# Patient Record
Sex: FEMALE | Race: OTHER | HISPANIC OR LATINO | URBAN - METROPOLITAN AREA
[De-identification: names, ages, dates, MRNs, and addresses within clinical notes are randomized per-mention and may not be internally consistent; named-entity substitution may affect disease eponyms.]

---

## 2024-08-19 ENCOUNTER — HOSPITAL ENCOUNTER (EMERGENCY)
Facility: HOSPITAL | Age: 24
Discharge: HOME | End: 2024-08-19
Attending: EMERGENCY MEDICINE
Payer: MEDICAID

## 2024-08-19 VITALS
OXYGEN SATURATION: 100 % | SYSTOLIC BLOOD PRESSURE: 115 MMHG | WEIGHT: 165 LBS | RESPIRATION RATE: 16 BRPM | HEIGHT: 62 IN | BODY MASS INDEX: 30.36 KG/M2 | HEART RATE: 79 BPM | DIASTOLIC BLOOD PRESSURE: 60 MMHG | TEMPERATURE: 98 F

## 2024-08-19 DIAGNOSIS — R10.9 ABDOMINAL PAIN DURING PREGNANCY: ICD-10-CM

## 2024-08-19 DIAGNOSIS — K29.70 GASTRITIS, PRESENCE OF BLEEDING UNSPECIFIED, UNSPECIFIED CHRONICITY, UNSPECIFIED GASTRITIS TYPE: Primary | ICD-10-CM

## 2024-08-19 DIAGNOSIS — O26.899 ABDOMINAL PAIN DURING PREGNANCY: ICD-10-CM

## 2024-08-19 LAB
ALBUMIN SERPL BCP-MCNC: 3.4 G/DL (ref 3.5–5.2)
ALP SERPL-CCNC: 101 U/L (ref 55–135)
ALT SERPL W/O P-5'-P-CCNC: 13 U/L (ref 10–44)
ANION GAP SERPL CALC-SCNC: 11 MMOL/L (ref 8–16)
AST SERPL-CCNC: 13 U/L (ref 10–40)
BASOPHILS # BLD AUTO: 0.02 K/UL (ref 0–0.2)
BASOPHILS NFR BLD: 0.2 % (ref 0–1.9)
BILIRUB SERPL-MCNC: 0.3 MG/DL (ref 0.1–1)
BILIRUB UR QL STRIP: NEGATIVE
BUN SERPL-MCNC: 6 MG/DL (ref 6–20)
CALCIUM SERPL-MCNC: 9.6 MG/DL (ref 8.7–10.5)
CHLORIDE SERPL-SCNC: 105 MMOL/L (ref 95–110)
CLARITY UR: CLEAR
CO2 SERPL-SCNC: 23 MMOL/L (ref 23–29)
COLOR UR: COLORLESS
CREAT SERPL-MCNC: 0.6 MG/DL (ref 0.5–1.4)
DIFFERENTIAL METHOD BLD: ABNORMAL
EOSINOPHIL # BLD AUTO: 0 K/UL (ref 0–0.5)
EOSINOPHIL NFR BLD: 0.3 % (ref 0–8)
ERYTHROCYTE [DISTWIDTH] IN BLOOD BY AUTOMATED COUNT: 13.2 % (ref 11.5–14.5)
EST. GFR  (NO RACE VARIABLE): >60 ML/MIN/1.73 M^2
GLUCOSE SERPL-MCNC: 94 MG/DL (ref 70–110)
GLUCOSE UR QL STRIP: NEGATIVE
HCT VFR BLD AUTO: 33.4 % (ref 37–48.5)
HGB BLD-MCNC: 11.7 G/DL (ref 12–16)
HGB UR QL STRIP: NEGATIVE
IMM GRANULOCYTES # BLD AUTO: 0.21 K/UL (ref 0–0.04)
IMM GRANULOCYTES NFR BLD AUTO: 2.2 % (ref 0–0.5)
KETONES UR QL STRIP: NEGATIVE
LEUKOCYTE ESTERASE UR QL STRIP: NEGATIVE
LIPASE SERPL-CCNC: 16 U/L (ref 4–60)
LYMPHOCYTES # BLD AUTO: 2.6 K/UL (ref 1–4.8)
LYMPHOCYTES NFR BLD: 27 % (ref 18–48)
MCH RBC QN AUTO: 31.5 PG (ref 27–31)
MCHC RBC AUTO-ENTMCNC: 35 G/DL (ref 32–36)
MCV RBC AUTO: 90 FL (ref 82–98)
MONOCYTES # BLD AUTO: 0.5 K/UL (ref 0.3–1)
MONOCYTES NFR BLD: 4.7 % (ref 4–15)
NEUTROPHILS # BLD AUTO: 6.3 K/UL (ref 1.8–7.7)
NEUTROPHILS NFR BLD: 65.6 % (ref 38–73)
NITRITE UR QL STRIP: NEGATIVE
NRBC BLD-RTO: 0 /100 WBC
PH UR STRIP: 7 [PH] (ref 5–8)
PLATELET # BLD AUTO: 240 K/UL (ref 150–450)
PMV BLD AUTO: 9.7 FL (ref 9.2–12.9)
POTASSIUM SERPL-SCNC: 3.5 MMOL/L (ref 3.5–5.1)
PROT SERPL-MCNC: 7.5 G/DL (ref 6–8.4)
PROT UR QL STRIP: NEGATIVE
RBC # BLD AUTO: 3.71 M/UL (ref 4–5.4)
SODIUM SERPL-SCNC: 139 MMOL/L (ref 136–145)
SP GR UR STRIP: 1 (ref 1–1.03)
URN SPEC COLLECT METH UR: ABNORMAL
UROBILINOGEN UR STRIP-ACNC: NEGATIVE EU/DL
WBC # BLD AUTO: 9.55 K/UL (ref 3.9–12.7)

## 2024-08-19 PROCEDURE — 99285 EMERGENCY DEPT VISIT HI MDM: CPT | Mod: 25

## 2024-08-19 PROCEDURE — 63600175 PHARM REV CODE 636 W HCPCS: Performed by: NURSE PRACTITIONER

## 2024-08-19 PROCEDURE — 83690 ASSAY OF LIPASE: CPT | Performed by: NURSE PRACTITIONER

## 2024-08-19 PROCEDURE — 96374 THER/PROPH/DIAG INJ IV PUSH: CPT

## 2024-08-19 PROCEDURE — 81003 URINALYSIS AUTO W/O SCOPE: CPT | Performed by: NURSE PRACTITIONER

## 2024-08-19 PROCEDURE — 85025 COMPLETE CBC W/AUTO DIFF WBC: CPT | Performed by: NURSE PRACTITIONER

## 2024-08-19 PROCEDURE — 25000003 PHARM REV CODE 250: Performed by: EMERGENCY MEDICINE

## 2024-08-19 PROCEDURE — 80053 COMPREHEN METABOLIC PANEL: CPT | Performed by: NURSE PRACTITIONER

## 2024-08-19 RX ORDER — LIDOCAINE HYDROCHLORIDE 20 MG/ML
15 SOLUTION OROPHARYNGEAL ONCE
Status: COMPLETED | OUTPATIENT
Start: 2024-08-19 | End: 2024-08-19

## 2024-08-19 RX ORDER — SUCRALFATE 1 G/10ML
1 SUSPENSION ORAL 4 TIMES DAILY
Qty: 414 ML | Refills: 0 | Status: SHIPPED | OUTPATIENT
Start: 2024-08-19

## 2024-08-19 RX ORDER — ALUMINUM HYDROXIDE, MAGNESIUM HYDROXIDE, AND SIMETHICONE 1200; 120; 1200 MG/30ML; MG/30ML; MG/30ML
30 SUSPENSION ORAL ONCE
Status: COMPLETED | OUTPATIENT
Start: 2024-08-19 | End: 2024-08-19

## 2024-08-19 RX ORDER — ONDANSETRON 4 MG/1
4 TABLET, FILM COATED ORAL EVERY 6 HOURS
Qty: 12 TABLET | Refills: 0 | Status: SHIPPED | OUTPATIENT
Start: 2024-08-19

## 2024-08-19 RX ORDER — ONDANSETRON HYDROCHLORIDE 2 MG/ML
4 INJECTION, SOLUTION INTRAVENOUS
Status: COMPLETED | OUTPATIENT
Start: 2024-08-19 | End: 2024-08-19

## 2024-08-19 RX ADMIN — ALUMINUM HYDROXIDE, MAGNESIUM HYDROXIDE, AND SIMETHICONE 30 ML: 1200; 120; 1200 SUSPENSION ORAL at 10:08

## 2024-08-19 RX ADMIN — LIDOCAINE HYDROCHLORIDE 15 ML: 20 SOLUTION ORAL at 10:08

## 2024-08-19 RX ADMIN — ONDANSETRON 4 MG: 2 INJECTION INTRAMUSCULAR; INTRAVENOUS at 09:08

## 2024-08-20 NOTE — DISCHARGE INSTRUCTIONS
Your labs show no infection. The fetus looks good. You likely have gastritis. Follow the instructions given to you about changing your diet. Medicine was sent to your pharmacy. Follow up with your doctor.

## 2024-08-20 NOTE — ED PROVIDER NOTES
Emergency Department Encounter  Provider Note  Encounter Date: 2024    Patient Name: Cindy Greenberg  MRN: [id number]    History of Present Illness   HPI  History of Present Illness:    Chief Complaint:   Chief Complaint   Patient presents with    Abdominal Pain     Since this morning that has been intermittent. States it is mid/epigastric abdominal pain. States she is  BET[place] 23. Also endorses Nausea and diarrhea.. Denies vaginal discharge.      23yo  at 19w6d pw epigastric pain, nausea, diarrhea. Denies vaginal bleeding or discharge. Tried nothing at home for symptoms. No fevers.     The following PMH/PSH/SocHx/FamHx has been reviewed by myself:  History reviewed. No pertinent past medical history.  History reviewed. No pertinent surgical history.     No family history on file.  Allergies reviewed:  Review of patient's allergies indicates:  No Known Allergies  Medications reviewed:  Discharge Medication List as of 2024 11:14 PM        START taking these medications    Details   sucralfate (CARAFATE) 100 mg/mL suspension Take 10 mLs (1 g total) by mouth 4 (four) times daily., Starting Mon 2024, Normal             Physical Exam     Initial Vitals [24 194]   BP Pulse Resp Temp SpO2   115/60 79 16 97.5 °F (36.4 °C) 100 %      MAP       --           Triage vital signs reviewed.    Constitutional: Well-nourished, well-developed. Not in acute distress.  HENT: Normocephalic, atraumatic. Moist mucous membranes.  Eyes: No conjunctival injection.  Resp: Normal respiratory effort, breathing unlabored.  Cardio: Regular rate and rhythm.  GI: Abdomen gravid. Slight ttp in epigastrium, no RUQ, RLQ ttp.   MSK: Extremities without any deformities noted. No lower extremity edema.  Skin: Warm and dry. No rashes or lesions noted.  Neuro: Awake and alert. Moves all extremities.    ED Course   Procedures    Medical Decision Making    History Acquisition     The history is provided by the  patient.     Review of prior external/non ED notes: 6/14/24 ob us with IUP 10 w 3d     Differential Diagnoses   Based on available information and initial assessment, the working Differential Diagnosis includes, but is not limited to:  AAA, aortic dissection, mesenteric ischemia, perforated viscous, MI/ACS, SBO/volvulus, incarcerated/strangulated hernia, intussusception, ileus, appendicitis, cholecystitis, cholangitis, diverticulitis, esophagitis, hepatitis, nephrolithiasis, pancreatitis, gastroenteritis, colitis, IBD/IBS, biliary colic, GERD, PUD, constipation, UTI/pyelonephritis,  disorder.    EKG   EKG ordered and independently reviewed by me:     Labs   Lab tests ordered and independently reviewed by me:    Labs Reviewed   CBC W/ AUTO DIFFERENTIAL - Abnormal       Result Value    WBC 9.55      RBC 3.71 (*)     Hemoglobin 11.7 (*)     Hematocrit 33.4 (*)     MCV 90      MCH 31.5 (*)     MCHC 35.0      RDW 13.2      Platelets 240      MPV 9.7      Immature Granulocytes 2.2 (*)     Gran # (ANC) 6.3      Immature Grans (Abs) 0.21 (*)     Lymph # 2.6      Mono # 0.5      Eos # 0.0      Baso # 0.02      nRBC 0      Gran % 65.6      Lymph % 27.0      Mono % 4.7      Eosinophil % 0.3      Basophil % 0.2      Differential Method Automated     COMPREHENSIVE METABOLIC PANEL - Abnormal    Sodium 139      Potassium 3.5      Chloride 105      CO2 23      Glucose 94      BUN 6      Creatinine 0.6      Calcium 9.6      Total Protein 7.5      Albumin 3.4 (*)     Total Bilirubin 0.3      Alkaline Phosphatase 101      AST 13      ALT 13      eGFR >60      Anion Gap 11     URINALYSIS, REFLEX TO URINE CULTURE - Abnormal    Specimen UA Urine, Clean Catch      Color, UA Colorless (*)     Appearance, UA Clear      pH, UA 7.0      Specific Gravity, UA 1.005      Protein, UA Negative      Glucose, UA Negative      Ketones, UA Negative      Bilirubin (UA) Negative      Occult Blood UA Negative      Nitrite, UA Negative       Urobilinogen, UA Negative      Leukocytes, UA Negative      Narrative:     Specimen Source->Urine   LIPASE    Lipase 16         Imaging   Imaging ordered and independently reviewed by me:   Imaging Results              US OB 14+ Wks, TransAbd, Single Gestation (Final result)  Result time 08/19/24 21:45:01      Final result by Alejo Wolfe MD (08/19/24 21:45:01)                   Impression:      Single live intrauterine pregnancy noted in cephalic presentation..      Electronically signed by: Alejo Wolfe  Date:    08/19/2024  Time:    21:45               Narrative:    EXAMINATION:  US OB 14+ WEEKS, TRANSABDOM, SINGLE GESTATION    CLINICAL HISTORY:  Other specified pregnancy related conditions, unspecified trimester    TECHNIQUE:  Transabdominal 2nd or 3rd trimester obstetrical ultrasound was performed.    COMPARISON:  None.    FINDINGS:  Transabdominal limited obstetrical ultrasound was performed, an intrauterine pregnancy is identified in cephalic presentation with a posterior placenta.  There is no evidence for placental hemorrhage.  The fetal heart rate is 149 beats per minute.  The maximal vertical pocket of amniotic fluid is 3.49 cm.  The cervix is not well visualized.  Fetal measurements to calculate gestational age not obtained.                                         Additional Consideration   Cindy Greenberg  presents to the Emergency Department today with epigastric pain, n/v/d. Pregnant. Labs and us done from triage; unremarkable exam. Possible gastritis. No evidence cholecystitis, pancreatitis, appendicitis. Dc with carafate, zofran.     Additional testing considered but not indicated during the course of this workup: further imaging and labwork, not indicated  Co-morbidities/chronic illness/exacerbation of chronic illness considered which impacted clinical decision making: none  Procedures done in the ED or plan for the OR: No  Social determinants of care considered during  development of treatment plan include: Decreased medical literacy  Discussion of management or test interpretation with external provider: No  DNR discussion: No    The patient's list of active medications and allergies as documented per RN staff has been reviewed.  Medications given in the ED and/or prescribed:   Medications   ondansetron injection 4 mg (4 mg Intravenous Given 8/19/24 2114)   aluminum-magnesium hydroxide-simethicone 200-200-20 mg/5 mL suspension 30 mL (30 mLs Oral Given 8/19/24 2244)     And   LIDOcaine viscous HCl 2% oral solution 15 mL (15 mLs Oral Given 8/19/24 2244)             ED Course as of 08/19/24 2341   Mon Aug 19, 2024   2033 Urinalysis, Reflex to Urine Culture Urine, Clean Catch(!) [DT]   2341 CBC auto differential(!)  Independently interpreted by me; unremarkable or consistent with the patient's baseline   [CS]   2341 Comprehensive metabolic panel(!)  Independently interpreted by me; unremarkable or consistent with the patient's baseline   [CS]   2341 Lipase: 16 [CS]   2341 US OB 14+ Wks, TransAbd, Single Gestation  IUP, no abnormalities noted [CS]      ED Course User Index  [CS] Daja Burrell MD  [DT] Joya Velez, ALEJANDRA       Explanation of disposition: Discharge    Clinical Impression:     1. Gastritis, presence of bleeding unspecified, unspecified chronicity, unspecified gastritis type    2. Abdominal pain during pregnancy         All results from the workup were reviewed with the patient/family in detail. I discussed with the patient and/or the family/caregiver that today's evaluation in the ED does not suggest any emergent or life-threatening medical conditions that would require hospitalization or immediate intervention beyond what was provided in the ED. I believe the patient is safe for discharge.  However, a reassuring evaluation in the ED does not preclude the presence or development of a serious or life-threatening condition. As such, strict return precautions were  discussed with the patient expressing understanding of instructions, and all questions answered. The patient/family communicates understanding of all this information and all remaining questions and concerns were addressed at this time.    The patient/family has been provided with verbal and printed direction regarding our final diagnosis(es) as well as instructions regarding use of OTC and/or Rx medications intended to manage the patient's aforementioned conditions including:  ED Prescriptions       Medication Sig Dispense Start Date End Date Auth. Provider    sucralfate (CARAFATE) 100 mg/mL suspension Take 10 mLs (1 g total) by mouth 4 (four) times daily. 414 mL 8/19/2024 -- Daja Burrell MD    ondansetron (ZOFRAN) 4 MG tablet Take 1 tablet (4 mg total) by mouth every 6 (six) hours. 12 tablet 8/19/2024 -- Daja Burrell MD          The patient's condition does not warrant review of the  and prescription of controlled substances.      ED Disposition Condition    Discharge Stable               Daja Burrell MD  08/19/24 4346

## 2024-10-08 ENCOUNTER — HOSPITAL ENCOUNTER (OUTPATIENT)
Facility: HOSPITAL | Age: 24
Discharge: HOME | End: 2024-10-08
Attending: OBSTETRICS & GYNECOLOGY | Admitting: OBSTETRICS & GYNECOLOGY
Payer: MEDICAID

## 2024-10-08 VITALS
SYSTOLIC BLOOD PRESSURE: 95 MMHG | DIASTOLIC BLOOD PRESSURE: 54 MMHG | WEIGHT: 164.88 LBS | RESPIRATION RATE: 19 BRPM | HEART RATE: 68 BPM | HEIGHT: 62 IN | OXYGEN SATURATION: 96 % | BODY MASS INDEX: 30.34 KG/M2 | TEMPERATURE: 98 F

## 2024-10-08 DIAGNOSIS — O26.899 ABDOMINAL PAIN AFFECTING PREGNANCY: ICD-10-CM

## 2024-10-08 DIAGNOSIS — R10.9 ABDOMINAL PAIN AFFECTING PREGNANCY: ICD-10-CM

## 2024-10-08 LAB
ALBUMIN SERPL BCP-MCNC: 2.9 G/DL (ref 3.5–5.2)
ALP SERPL-CCNC: 111 U/L (ref 55–135)
ALT SERPL W/O P-5'-P-CCNC: 9 U/L (ref 10–44)
ANION GAP SERPL CALC-SCNC: 11 MMOL/L (ref 8–16)
AST SERPL-CCNC: 11 U/L (ref 10–40)
BASOPHILS # BLD AUTO: 0.02 K/UL (ref 0–0.2)
BASOPHILS NFR BLD: 0.2 % (ref 0–1.9)
BILIRUB SERPL-MCNC: 0.3 MG/DL (ref 0.1–1)
BILIRUB UR QL STRIP: NEGATIVE
BUN SERPL-MCNC: 7 MG/DL (ref 6–20)
CALCIUM SERPL-MCNC: 8.8 MG/DL (ref 8.7–10.5)
CHLORIDE SERPL-SCNC: 106 MMOL/L (ref 95–110)
CLARITY UR: ABNORMAL
CO2 SERPL-SCNC: 19 MMOL/L (ref 23–29)
COLOR UR: YELLOW
CREAT SERPL-MCNC: 0.6 MG/DL (ref 0.5–1.4)
DIFFERENTIAL METHOD BLD: ABNORMAL
EOSINOPHIL # BLD AUTO: 0 K/UL (ref 0–0.5)
EOSINOPHIL NFR BLD: 0.1 % (ref 0–8)
ERYTHROCYTE [DISTWIDTH] IN BLOOD BY AUTOMATED COUNT: 12.7 % (ref 11.5–14.5)
EST. GFR  (NO RACE VARIABLE): >60 ML/MIN/1.73 M^2
GLUCOSE SERPL-MCNC: 173 MG/DL (ref 70–110)
GLUCOSE UR QL STRIP: NEGATIVE
HCT VFR BLD AUTO: 31.2 % (ref 37–48.5)
HGB BLD-MCNC: 10.8 G/DL (ref 12–16)
HGB UR QL STRIP: NEGATIVE
IMM GRANULOCYTES # BLD AUTO: 0.13 K/UL (ref 0–0.04)
IMM GRANULOCYTES NFR BLD AUTO: 1.1 % (ref 0–0.5)
KETONES UR QL STRIP: NEGATIVE
LEUKOCYTE ESTERASE UR QL STRIP: NEGATIVE
LYMPHOCYTES # BLD AUTO: 1.7 K/UL (ref 1–4.8)
LYMPHOCYTES NFR BLD: 14.5 % (ref 18–48)
MCH RBC QN AUTO: 31.8 PG (ref 27–31)
MCHC RBC AUTO-ENTMCNC: 34.6 G/DL (ref 32–36)
MCV RBC AUTO: 92 FL (ref 82–98)
MONOCYTES # BLD AUTO: 0.3 K/UL (ref 0.3–1)
MONOCYTES NFR BLD: 2.3 % (ref 4–15)
NEUTROPHILS # BLD AUTO: 9.4 K/UL (ref 1.8–7.7)
NEUTROPHILS NFR BLD: 81.8 % (ref 38–73)
NITRITE UR QL STRIP: NEGATIVE
NRBC BLD-RTO: 0 /100 WBC
PH UR STRIP: 7 [PH] (ref 5–8)
PLATELET # BLD AUTO: 201 K/UL (ref 150–450)
PMV BLD AUTO: 9.8 FL (ref 9.2–12.9)
POTASSIUM SERPL-SCNC: 3.2 MMOL/L (ref 3.5–5.1)
PROT SERPL-MCNC: 6.5 G/DL (ref 6–8.4)
PROT UR QL STRIP: ABNORMAL
RBC # BLD AUTO: 3.4 M/UL (ref 4–5.4)
SODIUM SERPL-SCNC: 136 MMOL/L (ref 136–145)
SP GR UR STRIP: 1.02 (ref 1–1.03)
URN SPEC COLLECT METH UR: ABNORMAL
UROBILINOGEN UR STRIP-ACNC: NEGATIVE EU/DL
WBC # BLD AUTO: 11.52 K/UL (ref 3.9–12.7)

## 2024-10-08 PROCEDURE — 85025 COMPLETE CBC W/AUTO DIFF WBC: CPT | Performed by: OBSTETRICS & GYNECOLOGY

## 2024-10-08 PROCEDURE — 36415 COLL VENOUS BLD VENIPUNCTURE: CPT | Performed by: OBSTETRICS & GYNECOLOGY

## 2024-10-08 PROCEDURE — 96360 HYDRATION IV INFUSION INIT: CPT

## 2024-10-08 PROCEDURE — 59025 FETAL NON-STRESS TEST: CPT

## 2024-10-08 PROCEDURE — 59025 FETAL NON-STRESS TEST: CPT | Mod: 26,,, | Performed by: OBSTETRICS & GYNECOLOGY

## 2024-10-08 PROCEDURE — 96361 HYDRATE IV INFUSION ADD-ON: CPT

## 2024-10-08 PROCEDURE — 63600175 PHARM REV CODE 636 W HCPCS: Performed by: OBSTETRICS & GYNECOLOGY

## 2024-10-08 PROCEDURE — 99211 OFF/OP EST MAY X REQ PHY/QHP: CPT

## 2024-10-08 PROCEDURE — 96374 THER/PROPH/DIAG INJ IV PUSH: CPT

## 2024-10-08 PROCEDURE — G0378 HOSPITAL OBSERVATION PER HR: HCPCS

## 2024-10-08 PROCEDURE — 99214 OFFICE O/P EST MOD 30 MIN: CPT | Mod: 25,TH,, | Performed by: OBSTETRICS & GYNECOLOGY

## 2024-10-08 PROCEDURE — 81003 URINALYSIS AUTO W/O SCOPE: CPT | Performed by: OBSTETRICS & GYNECOLOGY

## 2024-10-08 PROCEDURE — 25000003 PHARM REV CODE 250: Performed by: OBSTETRICS & GYNECOLOGY

## 2024-10-08 PROCEDURE — 80053 COMPREHEN METABOLIC PANEL: CPT | Performed by: OBSTETRICS & GYNECOLOGY

## 2024-10-08 RX ORDER — ACETAMINOPHEN 325 MG/1
650 TABLET ORAL ONCE
Status: COMPLETED | OUTPATIENT
Start: 2024-10-08 | End: 2024-10-08

## 2024-10-08 RX ORDER — ONDANSETRON HYDROCHLORIDE 2 MG/ML
4 INJECTION, SOLUTION INTRAVENOUS ONCE
Status: COMPLETED | OUTPATIENT
Start: 2024-10-08 | End: 2024-10-08

## 2024-10-08 RX ORDER — DEXTROSE, SODIUM CHLORIDE, SODIUM LACTATE, POTASSIUM CHLORIDE, AND CALCIUM CHLORIDE 5; .6; .31; .03; .02 G/100ML; G/100ML; G/100ML; G/100ML; G/100ML
INJECTION, SOLUTION INTRAVENOUS CONTINUOUS
Status: DISCONTINUED | OUTPATIENT
Start: 2024-10-08 | End: 2024-10-08 | Stop reason: HOSPADM

## 2024-10-08 RX ADMIN — SODIUM CHLORIDE, SODIUM LACTATE, POTASSIUM CHLORIDE, CALCIUM CHLORIDE AND DEXTROSE MONOHYDRATE: 5; 600; 310; 30; 20 INJECTION, SOLUTION INTRAVENOUS at 07:10

## 2024-10-08 RX ADMIN — ONDANSETRON 4 MG: 2 INJECTION INTRAMUSCULAR; INTRAVENOUS at 07:10

## 2024-10-08 RX ADMIN — ACETAMINOPHEN 650 MG: 325 TABLET ORAL at 08:10

## 2024-10-08 NOTE — DISCHARGE INSTRUCTIONS
Prenatal Concerns and Teaching    Severe headache not relieved with a dose of tylenol   Blurry vision or seeing black spots  Sudden swelling in your face or hands  Sudden weight gain in only a few days   Severe stomach pains or cramps  Vomiting lasting more than 24 hours   Fever greater than 100.4 degrees  Vaginal bleeding that is more than just spotting   Excessive and unusual vaginal discharge   A gush or flow of water fluid from your vagina   Significant decrease or absence of baby's movement (starting at 24-36 weeks)   (less than 37 weeks): more than 4 contractions in an hour for 2 hours   Term (greater than 37 weeks): contractions every 3-5 minutes for 2 hours  Increase fluid intake to 8-10 glasses of water DAILY    FOLLOW UP WITH MD AS SCHEDULED     LABOR     What is  labor?   labor is labor that occurs before 37 completed weeks of pregnancy.  Your baby could be born to early and have serious health problems.    Will you have  labor?   labor can happen to any woman, but some women are at greater risk than others.  A woman is more likely to have  labor if she:  Is pregnant with twins or more  Had a premature birth (before 37 completed weeks of pregnancy), in another pregnancy  Has certain problems of the uterus or cervix    Call your health care provider or go to the hospital right away if you have any of these warning signs:  Contractions that make your belly tighten up like a fist every 10 minutes or more often  Change in the color of your vaginal discharge, or bleeding from your vagina  The feeling that your baby is pushing down.  This is called pelvic pressure  Low, dull backache  Cramps that feel like your period  Belly cramps with or without diarrhea      Home Care:   It can help to drink plenty of water and take warm baths. Do what you can ahead of time to prepare for giving birth so youll have less to worry about later.   Keep a record of your  contractions. Write down what time each one starts and how long it lasts. A stopwatch is helpful. Look for the pattern of regularly spaced out contractions with a gradual increase in the time each one lasts.   Dont be embarrassed about going to the hospital with a false alarm.     More about contractions    When any muscle in your body contracts, it becomes tight or hard to the touch.  Your uterus is a muscle.  When it contracts, you will feel it tighten like a fist.  A contraction doesn't have to be painful.  It may feel like cramping or lower back pain.  When the contraction stops, your uterus becomes soft again.  It's normal for your uterus to contract at times during pregnancy.  This may happen when you first lie down, after sex, or after you walk up and down stairs.  It is not normal to have regular, frequent contractions before your baby is due.  If you feel a contraction every 10 minutes or more often during 1 hour (more than five contractions in an hour), then your uterus is josé too much.  Call your health care provider if this happens.         GOING THE FULL 40 WEEKS      More and more births are being scheduled a little early for non-medical reasons.  Experts are learning that this can cause problems for both mom and baby.  If your pregnancy is healthy, wait for labor to begin on its own.    If your pregnancy is healthy and you are planning to schedule your baby's birth, its best to stay pregnant for at least 39 weeks.  Babies born too early may have more health problems at birth and later in life than babies born later.  Being pregnant 39 weeks gives your baby's body all the time it needs to grow.    Here's why your baby needs 39 weeks:    Important organs, like his brain, lungs and liver, get all the time they need to develop  He/she is less likely to have vision and hearing problems after birth  He/she has time to gain more weight in the womb.  Babies born a healthy weight have an easier time  staying warm than babies born too small  He/she can suck and swallow and stay awake long to eat after he/she's born.  Babies born early sometimes can't do these things.    KICK COUNTS    Its normal to worry about your babys health. One way you can know your babys doing well is to record the babys movements once a day. This is called a kick count. You will usually feel your baby move by the 20th week of pregnancy. Remember to take your kick count records to all your appointments with your healthcare provider.     How to Count Kicks   Choose a time when the baby is active, such as after a meal.   Sit comfortably or lie on your side.   The first time the baby moves, write down the time.   Count each movement until the baby has moved 10 times. This can take from 20 minutes to 2 hours.   If the baby hasnt moved 4 times in 1 hour, pat your stomach to wake the baby up.   Write down the time you feel the babys 10th movement.   Try to do it at the same time each day.    When to Call Your Healthcare Provider     Call your healthcare provider right away if you notice any of the following:   Your baby moves fewer than 10 times in 4 hours while youre doing kick counts.   Your baby moves much less often than on the days before.   You have not felt your baby move all day.      TIME NUMBER OF KICKS                                                  Over the Counter Medications Safe to Take During Pregnancy       Pain Relief   Tylenol or acetaminophen (plain/extra strength)   Caution: Do not take aspirin (Anacin, Jennifer) or ibuprofen (Advil, Motrin).     Medicine for Digestive Upsets   Antacid (Tums, Rolaids, Mylanta, Maalox, Pepcid, Prevacid)   Simethicone (Gas-X, Mylicon for gas pain, Gaviscon)   Imodium or BRAT diet (bananas, rice, applesauce, toast or tea) for diarrhea   Medicine for Coughs/Colds   Guaifenesin (Robitussin)   Guaifenesin plus dextromethorphan (Robitussin-DM)   Cough Drops   Vicks VapoRub   Acetaminophen    Allergy Relief   Tylenol or acetaminophen (plain/extra strength) is ok   Chlorpheniramine antihistamine alone (chlor-Trimetron)   Benadryl tablets   Saline nasal spray   Neti-pot or sinus rinse   Claritin, Zyrtec, Allegra   Options for Constipation   Fiber can be used regularly (Metamucil, MiraLax, Citrucel, BeneFiber)   Laxatives can be used occasionally (Colace, Dulcolax)   Tucks for hemorrhoids   Mix of Prune Juice, OJ, 7UP- equal parts

## 2024-10-08 NOTE — PROGRESS NOTES
Discharge instructions reviewed with pt. Verbalizes understanding. No further questions or concerns at this time. Discharged home for self care.

## 2024-10-08 NOTE — H&P
"HISTORY AND PHYSICAL  ANTEPARTUM          Subjective:       Cindy Greenberg is a 24 y.o.  female with IUP at 27w0d via patient prsenting today with nausea and vomitting this morning. States she ate a salad and subsequently experienced NBNB vomitting. Currently follows with Dr. Tafoya at [facility]. Prior to this, states she did have one ED visit for N/V, but was diagnosed with GERD and discharged home. Denies any vaginal bleeding or vaginal discharge. Fundal height on examination this visit notable for 30 weeks GA, however, GA is only 27 weeks. Denies contractions or abdominal pain. FHR appropriate at 140s.     PMHx: No past medical history on file.    PSHx: No past surgical history on file.    All: Review of patient's allergies indicates:  No Known Allergies    Meds:   Medications Prior to Admission   Medication Sig Dispense Refill Last Dose/Taking    ondansetron (ZOFRAN) 4 MG tablet Take 1 tablet (4 mg total) by mouth every 6 (six) hours. 12 tablet 0     sucralfate (CARAFATE) 100 mg/mL suspension Take 10 mLs (1 g total) by mouth 4 (four) times daily. 414 mL 0        SH:   Social History     Socioeconomic History    Marital status: Unknown       FH: No family history on file.    OBHx:   OB History    Para Term  AB Living   1 0 0 0 0 0   SAB IAB Ectopic Multiple Live Births   0 0 0 0 0      # Outcome Date GA Lbr Ronald/2nd Weight Sex Type Anes PTL Lv   1 Current                Objective:       BP (!) 95/54   Pulse 68   Temp 98.2 °F (36.8 °C) (Oral)   Resp 19   Ht 5' 2" (1.575 m)   Wt 74.8 kg (164 lb 14.5 oz)   SpO2 96%   BMI 30.16 kg/m²     Vitals:    10/08/24 0857 10/08/24 0858 10/08/24 0902 10/08/24 0903   BP:    (!) 95/54   Pulse: 65 65 67 68   Resp:       Temp:       TempSrc:       SpO2: 96%  96%    Weight:       Height:           General:   alert, appears stated age, and cooperative   Lungs:   clear to auscultation bilaterally   Heart:   regular rate and rhythm, S1, S2 normal, " no murmur, click, rub or gallop   Abdomen:  soft, non-tender; bowel sounds normal; no masses,  no organomegaly   Extremities negative edema, negative erythema   FHT: 140s Cat 1 (reassuring)                 TOCO: Not assessed   Presentations: Not assessed   Cervix:     Dilation: Closed    Effacement: 30%    Station:  0               Assessment:      27w0d weeks gestation presents for nausea and vomiting after eating bad salad. Received IV zofran and PO tylenol with improvement to symptoms. FHR stable and patient denied any additional episodes after.      Plan:     Stable for discharge.   Please follow up with outpatient OBGYN.  ED precautions discussed.     Shane Anaya MD  [facility] PGY-2     Case discussed with Dr. Arroyo, attestation to follow.

## 2024-10-08 NOTE — NURSING
Pt presents to labor and delivery with complaints of upper abdominal pain with nausea/vomiting that started around 3am. Pt states that she ate a salad at 6pm. She denies any contractions, vaginal bleeding, or leaking of vaginal fluids. External fetal monitor applied. FHR 130s. Moderate variability. +accels. -decels. No contractions noted at this time. Dr. Arroyo notified of patient's arrival. Bed in lowest position, call light within reach. Patient safety maintained.

## 2025-02-02 ENCOUNTER — HOSPITAL ENCOUNTER (OUTPATIENT)
Facility: HOSPITAL | Age: 25
Discharge: HOME | End: 2025-02-03
Attending: EMERGENCY MEDICINE | Admitting: STUDENT IN AN ORGANIZED HEALTH CARE EDUCATION/TRAINING PROGRAM

## 2025-02-02 DIAGNOSIS — K35.80 ACUTE APPENDICITIS: Primary | ICD-10-CM

## 2025-02-02 LAB
ALBUMIN SERPL BCP-MCNC: 4.2 G/DL (ref 3.5–5.2)
ALP SERPL-CCNC: 108 U/L (ref 40–150)
ALT SERPL W/O P-5'-P-CCNC: 44 U/L (ref 10–44)
ANION GAP SERPL CALC-SCNC: 15 MMOL/L (ref 8–16)
AST SERPL-CCNC: 32 U/L (ref 10–40)
B-HCG UR QL: NEGATIVE
BASOPHILS # BLD AUTO: 0.01 K/UL (ref 0–0.2)
BASOPHILS NFR BLD: 0.1 % (ref 0–1.9)
BILIRUB SERPL-MCNC: 0.4 MG/DL (ref 0.1–1)
BILIRUB UR QL STRIP: NEGATIVE
BUN SERPL-MCNC: 19 MG/DL (ref 6–20)
CALCIUM SERPL-MCNC: 9 MG/DL (ref 8.7–10.5)
CHLORIDE SERPL-SCNC: 108 MMOL/L (ref 95–110)
CLARITY UR: CLEAR
CO2 SERPL-SCNC: 17 MMOL/L (ref 23–29)
COLOR UR: YELLOW
CREAT SERPL-MCNC: 0.7 MG/DL (ref 0.5–1.4)
CTP QC/QA: YES
DIFFERENTIAL METHOD BLD: ABNORMAL
EOSINOPHIL # BLD AUTO: 0 K/UL (ref 0–0.5)
EOSINOPHIL NFR BLD: 0.1 % (ref 0–8)
ERYTHROCYTE [DISTWIDTH] IN BLOOD BY AUTOMATED COUNT: 12.6 % (ref 11.5–14.5)
EST. GFR  (NO RACE VARIABLE): >60 ML/MIN/1.73 M^2
GLUCOSE SERPL-MCNC: 108 MG/DL (ref 70–110)
GLUCOSE UR QL STRIP: NEGATIVE
HCT VFR BLD AUTO: 36.6 % (ref 37–48.5)
HGB BLD-MCNC: 12.9 G/DL (ref 12–16)
HGB UR QL STRIP: ABNORMAL
IMM GRANULOCYTES # BLD AUTO: 0.06 K/UL (ref 0–0.04)
IMM GRANULOCYTES NFR BLD AUTO: 0.5 % (ref 0–0.5)
INFLUENZA A, MOLECULAR: NEGATIVE
INFLUENZA B, MOLECULAR: NEGATIVE
KETONES UR QL STRIP: NEGATIVE
LEUKOCYTE ESTERASE UR QL STRIP: NEGATIVE
LIPASE SERPL-CCNC: 21 U/L (ref 4–60)
LYMPHOCYTES # BLD AUTO: 1.4 K/UL (ref 1–4.8)
LYMPHOCYTES NFR BLD: 10.5 % (ref 18–48)
MCH RBC QN AUTO: 30.3 PG (ref 27–31)
MCHC RBC AUTO-ENTMCNC: 35.2 G/DL (ref 32–36)
MCV RBC AUTO: 86 FL (ref 82–98)
MONOCYTES # BLD AUTO: 0.3 K/UL (ref 0.3–1)
MONOCYTES NFR BLD: 2.4 % (ref 4–15)
NEUTROPHILS # BLD AUTO: 11.3 K/UL (ref 1.8–7.7)
NEUTROPHILS NFR BLD: 86.4 % (ref 38–73)
NITRITE UR QL STRIP: NEGATIVE
NRBC BLD-RTO: 0 /100 WBC
PH UR STRIP: 7 [PH] (ref 5–8)
PLATELET # BLD AUTO: 274 K/UL (ref 150–450)
PMV BLD AUTO: 10.3 FL (ref 9.2–12.9)
POTASSIUM SERPL-SCNC: 3.5 MMOL/L (ref 3.5–5.1)
PROT SERPL-MCNC: 8 G/DL (ref 6–8.4)
PROT UR QL STRIP: ABNORMAL
RBC # BLD AUTO: 4.26 M/UL (ref 4–5.4)
SARS-COV-2 RDRP RESP QL NAA+PROBE: NEGATIVE
SODIUM SERPL-SCNC: 140 MMOL/L (ref 136–145)
SP GR UR STRIP: 1.02 (ref 1–1.03)
SPECIMEN SOURCE: NORMAL
URN SPEC COLLECT METH UR: ABNORMAL
UROBILINOGEN UR STRIP-ACNC: NEGATIVE EU/DL
WBC # BLD AUTO: 13.1 K/UL (ref 3.9–12.7)

## 2025-02-02 PROCEDURE — 63600175 PHARM REV CODE 636 W HCPCS: Performed by: EMERGENCY MEDICINE

## 2025-02-02 PROCEDURE — 25500020 PHARM REV CODE 255: Performed by: EMERGENCY MEDICINE

## 2025-02-02 PROCEDURE — G0378 HOSPITAL OBSERVATION PER HR: HCPCS

## 2025-02-02 PROCEDURE — 81003 URINALYSIS AUTO W/O SCOPE: CPT | Performed by: NURSE PRACTITIONER

## 2025-02-02 PROCEDURE — 96366 THER/PROPH/DIAG IV INF ADDON: CPT

## 2025-02-02 PROCEDURE — 87635 SARS-COV-2 COVID-19 AMP PRB: CPT

## 2025-02-02 PROCEDURE — 25000003 PHARM REV CODE 250

## 2025-02-02 PROCEDURE — 85025 COMPLETE CBC W/AUTO DIFF WBC: CPT | Performed by: NURSE PRACTITIONER

## 2025-02-02 PROCEDURE — 99285 EMERGENCY DEPT VISIT HI MDM: CPT | Mod: 25

## 2025-02-02 PROCEDURE — 83690 ASSAY OF LIPASE: CPT | Performed by: NURSE PRACTITIONER

## 2025-02-02 PROCEDURE — 25000003 PHARM REV CODE 250: Performed by: EMERGENCY MEDICINE

## 2025-02-02 PROCEDURE — 80053 COMPREHEN METABOLIC PANEL: CPT | Performed by: NURSE PRACTITIONER

## 2025-02-02 PROCEDURE — 87502 INFLUENZA DNA AMP PROBE: CPT

## 2025-02-02 PROCEDURE — 63600175 PHARM REV CODE 636 W HCPCS: Mod: TB,JZ

## 2025-02-02 PROCEDURE — 99222 1ST HOSP IP/OBS MODERATE 55: CPT | Mod: ,,, | Performed by: STUDENT IN AN ORGANIZED HEALTH CARE EDUCATION/TRAINING PROGRAM

## 2025-02-02 PROCEDURE — 96375 TX/PRO/DX INJ NEW DRUG ADDON: CPT

## 2025-02-02 PROCEDURE — 96365 THER/PROPH/DIAG IV INF INIT: CPT

## 2025-02-02 PROCEDURE — 96361 HYDRATE IV INFUSION ADD-ON: CPT

## 2025-02-02 PROCEDURE — 81025 URINE PREGNANCY TEST: CPT | Performed by: NURSE PRACTITIONER

## 2025-02-02 RX ORDER — LIDOCAINE HYDROCHLORIDE 10 MG/ML
1 INJECTION, SOLUTION EPIDURAL; INFILTRATION; INTRACAUDAL; PERINEURAL ONCE AS NEEDED
Status: DISCONTINUED | OUTPATIENT
Start: 2025-02-02 | End: 2025-02-03 | Stop reason: HOSPADM

## 2025-02-02 RX ORDER — TALC
6 POWDER (GRAM) TOPICAL NIGHTLY PRN
Status: DISCONTINUED | OUTPATIENT
Start: 2025-02-02 | End: 2025-02-03 | Stop reason: HOSPADM

## 2025-02-02 RX ORDER — IBUPROFEN 600 MG/1
600 TABLET ORAL EVERY 6 HOURS
COMMUNITY
Start: 2025-01-12

## 2025-02-02 RX ORDER — SODIUM CHLORIDE 0.9 % (FLUSH) 0.9 %
10 SYRINGE (ML) INJECTION
Status: DISCONTINUED | OUTPATIENT
Start: 2025-02-02 | End: 2025-02-03 | Stop reason: HOSPADM

## 2025-02-02 RX ORDER — SODIUM CHLORIDE, SODIUM LACTATE, POTASSIUM CHLORIDE, CALCIUM CHLORIDE 600; 310; 30; 20 MG/100ML; MG/100ML; MG/100ML; MG/100ML
INJECTION, SOLUTION INTRAVENOUS CONTINUOUS
Status: DISCONTINUED | OUTPATIENT
Start: 2025-02-02 | End: 2025-02-03

## 2025-02-02 RX ORDER — IBUPROFEN 600 MG/1
600 TABLET ORAL EVERY 6 HOURS PRN
Status: DISCONTINUED | OUTPATIENT
Start: 2025-02-02 | End: 2025-02-03 | Stop reason: HOSPADM

## 2025-02-02 RX ORDER — HYDROMORPHONE HYDROCHLORIDE 1 MG/ML
0.5 INJECTION, SOLUTION INTRAMUSCULAR; INTRAVENOUS; SUBCUTANEOUS EVERY 4 HOURS PRN
Status: DISCONTINUED | OUTPATIENT
Start: 2025-02-02 | End: 2025-02-03

## 2025-02-02 RX ORDER — HYDROMORPHONE HYDROCHLORIDE 1 MG/ML
1 INJECTION, SOLUTION INTRAMUSCULAR; INTRAVENOUS; SUBCUTANEOUS EVERY 4 HOURS PRN
Status: DISCONTINUED | OUTPATIENT
Start: 2025-02-02 | End: 2025-02-03

## 2025-02-02 RX ORDER — ENOXAPARIN SODIUM 100 MG/ML
40 INJECTION SUBCUTANEOUS EVERY 24 HOURS
Status: DISCONTINUED | OUTPATIENT
Start: 2025-02-03 | End: 2025-02-03 | Stop reason: HOSPADM

## 2025-02-02 RX ORDER — ONDANSETRON HYDROCHLORIDE 2 MG/ML
4 INJECTION, SOLUTION INTRAVENOUS
Status: COMPLETED | OUTPATIENT
Start: 2025-02-02 | End: 2025-02-02

## 2025-02-02 RX ORDER — MORPHINE SULFATE 4 MG/ML
4 INJECTION, SOLUTION INTRAMUSCULAR; INTRAVENOUS
Status: COMPLETED | OUTPATIENT
Start: 2025-02-02 | End: 2025-02-02

## 2025-02-02 RX ORDER — ONDANSETRON 8 MG/1
8 TABLET, ORALLY DISINTEGRATING ORAL EVERY 8 HOURS PRN
Status: DISCONTINUED | OUTPATIENT
Start: 2025-02-02 | End: 2025-02-03 | Stop reason: HOSPADM

## 2025-02-02 RX ORDER — ACETAMINOPHEN 500 MG
1000 TABLET ORAL EVERY 8 HOURS
Status: DISCONTINUED | OUTPATIENT
Start: 2025-02-02 | End: 2025-02-03 | Stop reason: HOSPADM

## 2025-02-02 RX ORDER — PROCHLORPERAZINE EDISYLATE 5 MG/ML
5 INJECTION INTRAMUSCULAR; INTRAVENOUS EVERY 6 HOURS PRN
Status: DISCONTINUED | OUTPATIENT
Start: 2025-02-02 | End: 2025-02-03 | Stop reason: HOSPADM

## 2025-02-02 RX ADMIN — PIPERACILLIN SODIUM AND TAZOBACTAM SODIUM 4.5 G: 4; .5 INJECTION, POWDER, LYOPHILIZED, FOR SOLUTION INTRAVENOUS at 04:02

## 2025-02-02 RX ADMIN — MORPHINE SULFATE 4 MG: 4 INJECTION INTRAVENOUS at 01:02

## 2025-02-02 RX ADMIN — ONDANSETRON 8 MG: 8 TABLET, ORALLY DISINTEGRATING ORAL at 11:02

## 2025-02-02 RX ADMIN — SODIUM CHLORIDE, POTASSIUM CHLORIDE, SODIUM LACTATE AND CALCIUM CHLORIDE: 600; 310; 30; 20 INJECTION, SOLUTION INTRAVENOUS at 07:02

## 2025-02-02 RX ADMIN — ONDANSETRON 4 MG: 2 INJECTION INTRAMUSCULAR; INTRAVENOUS at 12:02

## 2025-02-02 RX ADMIN — IOHEXOL 75 ML: 350 INJECTION, SOLUTION INTRAVENOUS at 02:02

## 2025-02-02 RX ADMIN — PIPERACILLIN SODIUM AND TAZOBACTAM SODIUM 4.5 G: 4; .5 INJECTION, POWDER, LYOPHILIZED, FOR SOLUTION INTRAVENOUS at 11:02

## 2025-02-02 RX ADMIN — HYDROMORPHONE HYDROCHLORIDE 1 MG: 1 INJECTION, SOLUTION INTRAMUSCULAR; INTRAVENOUS; SUBCUTANEOUS at 07:02

## 2025-02-02 NOTE — HPI
Cindy is a pleasant 24 yoF presenting with abdominal pain. She is 3 weeks s/p  of a healthy boy named Ricardo, whom she feeds with formula. She was doing well until early this morning when she began having periumbilical pain. The pain has since migrated to the hypogastric region. The pain has been associated with nausea and vomiting. No urinary symptoms. Workup in the ED demonstrated normal vitals, WBC of 13 with a left shift, and CT scan with a 1 cm appendix with periappendiceal fat stranding and wall thickening.    No other abdominal surgeries. No home medications. No allergies.

## 2025-02-02 NOTE — SUBJECTIVE & OBJECTIVE
No current facility-administered medications on file prior to encounter.     Current Outpatient Medications on File Prior to Encounter   Medication Sig    sucralfate (CARAFATE) 100 mg/mL suspension Take 10 mLs (1 g total) by mouth 4 (four) times daily.    ondansetron (ZOFRAN) 4 MG tablet Take 1 tablet (4 mg total) by mouth every 6 (six) hours.       Review of patient's allergies indicates:  No Known Allergies    History reviewed. No pertinent past medical history.  History reviewed. No pertinent surgical history.  Family History    None       Tobacco Use    Smoking status: Never    Smokeless tobacco: Never   Substance and Sexual Activity    Alcohol use: Not on file    Drug use: Not on file    Sexual activity: Not on file     Review of Systems   Constitutional:  Negative for chills and fever.   Respiratory:  Negative for cough and shortness of breath.    Cardiovascular:  Negative for chest pain and palpitations.   Gastrointestinal:  Positive for abdominal pain, nausea and vomiting.   Genitourinary:  Negative for dysuria.   Neurological:  Negative for dizziness and light-headedness.     Objective:     Vital Signs (Most Recent):  Temp: 97.6 °F (36.4 °C) (02/02/25 1224)  Pulse: 78 (02/02/25 1632)  Resp: (!) 22 (02/02/25 1354)  BP: 100/64 (02/02/25 1632)  SpO2: 99 % (02/02/25 1632) Vital Signs (24h Range):  Temp:  [97.6 °F (36.4 °C)] 97.6 °F (36.4 °C)  Pulse:  [48-78] 78  Resp:  [18-22] 22  SpO2:  [98 %-100 %] 99 %  BP: (100-125)/(56-68) 100/64     Weight: 72.6 kg (160 lb)  Body mass index is 29.26 kg/m².     Physical Exam  Vitals reviewed.   Constitutional:       Appearance: Normal appearance.   Cardiovascular:      Rate and Rhythm: Normal rate.      Pulses: Normal pulses.   Pulmonary:      Effort: Pulmonary effort is normal.   Abdominal:      General: There is no distension.      Palpations: Abdomen is soft. There is no mass.      Tenderness: There is abdominal tenderness. There is no guarding.      Comments: Lower  abdominal tenderness worse in the RLQ and suprapubic regions. Pfannenstiel incision healing well.    Skin:     General: Skin is warm and dry.   Neurological:      General: No focal deficit present.      Mental Status: She is alert and oriented to person, place, and time.            I have reviewed all pertinent lab results within the past 24 hours.  CBC:   Recent Labs   Lab 02/02/25  1243   WBC 13.10*   RBC 4.26   HGB 12.9   HCT 36.6*      MCV 86   MCH 30.3   MCHC 35.2     CMP:   Recent Labs   Lab 02/02/25  1243      CALCIUM 9.0   ALBUMIN 4.2   PROT 8.0      K 3.5   CO2 17*      BUN 19   CREATININE 0.7   ALKPHOS 108   ALT 44   AST 32   BILITOT 0.4       Significant Diagnostics:  I have reviewed all pertinent imaging results/findings within the past 24 hours.

## 2025-02-02 NOTE — ED PROVIDER NOTES
Encounter Date: 2025    History     Chief Complaint   Patient presents with    Abdominal Pain     Upper abd pain with n/v that began at 0500, pt has hx of gastritis. Denies fever, denies urinary complaints, last ate-last night         HPI  This is a 24 y.o. female who presents to the Emergency Department with mid abdominal pain that starts suddenly around 5:00 a.m., constant, not worsening or improving.  No associated diarrhea.  No fever, but states that she gets chills. States just had  2 weeks ago.  Denies any vaginal bleeding or discharge.  States had similar occurrence while she was pregnant.      History obtained for alternative sources:  None    Previous or outside records requested and reviewed:  2025 admission for pregnancy, delivery via     10/08/2024 admission for abdominal pain with n/v during pregnancy at 27 weeks gestation. FHR stable and symptom improvement during hospital stay.      Review of patient's allergies indicates:  No Known Allergies  History reviewed. No pertinent past medical history.  History reviewed. No pertinent surgical history.  No family history on file.  Social History     Tobacco Use    Smoking status: Never    Smokeless tobacco: Never       Review of Systems  All other systems reviewed and otherwise negative.    Physical Exam     Initial Vitals [25 1224]   BP Pulse Resp Temp SpO2   125/68 72 18 97.6 °F (36.4 °C) 100 %      MAP       --         Physical Exam  Constitutional:  Well-developed, well-nourished. No acute distress  HENT:  Normocephalic, atraumatic.   Eyes: Conjunctiva normal.   Neck: Normal range of motion.  Respiratory:  No respiratory distress or conversational dyspnea.    Cardiovascular: Regular rate and rhythm  GI:   scar clean, dry and intact.  She has periumbilical tenderness and lower abdominal tenderness with guarding.  She also has some right upper quadrant tenderness  Musculoskeletal:  No gross deformity.  Normal range  of motion of all major joints.  Integument:  Warm and dry. No rash.  Neurologic: Alert and oriented x3, MITCHELL, no gross deficits, GCS 15  Psychiatric:  Mood normal.       Medical Decision Making:     Differential Diagnosis:  Gastritis, GERD, biliary colic, gallstones, endometritis, postsurgical pain, post surgical infection, hematoma    Comorbidities taken into consideration for development of diagnosis and treatment plan include recent pregnancy.      Plan:  Orders Placed This Encounter    Influenza A & B by Molecular    CT Abdomen Pelvis With IV Contrast NO Oral Contrast    CBC W/ AUTO DIFFERENTIAL    Comp. Metabolic Panel    Lipase    Urinalysis, Reflex to Urine Culture Urine, Clean Catch    COVID-19 Rapid Screening    Diet Adult Regular    Setup Pelvic Tray    Void on call to Operating Room    Notify Physician - Potential Need of Opioid Reversal    Vital signs per unit routine    Full code    Pulse Oximetry Q4H    POCT urine pregnancy    Insert peripheral IV    Insert peripheral IV    Possible Hospitalization    Place in Observation    DISCHARGE PATIENT    Specimen to Pathology, Surgery General Surgery    ondansetron injection 4 mg    morphine injection 4 mg    iohexoL (OMNIPAQUE 350) injection 75 mL    piperacillin-tazobactam (ZOSYN) 4.5 g in D5W 100 mL IVPB (MB+)    LIDOcaine (PF) 10 mg/ml (1%) injection 10 mg    sodium chloride 0.9% flush 10 mL    ondansetron disintegrating tablet 8 mg    melatonin tablet 6 mg    enoxaparin injection 40 mg    piperacillin-tazobactam (ZOSYN) 4.5 g in D5W 100 mL IVPB (MB+)    acetaminophen tablet 1,000 mg    ibuprofen tablet 600 mg    prochlorperazine injection Soln 5 mg    HYDROcodone-acetaminophen 5-325 mg per tablet 1 tablet    HYDROcodone-acetaminophen (NORCO) 5-325 mg per tablet    HYDROmorphone (PF) injection 0.5 mg    HYDROmorphone (PF) injection 1 mg    ketorolac injection 15 mg    HYDROmorphone (PF) injection 0.5 mg    IP VTE LOW RISK PATIENT    Case Request Operating  Room: APPENDECTOMY, LAPAROSCOPIC    Progressive Mobility Protocol (mobilize patient to their highest level of functioning at least twice daily)        Social determinants of health taken into consideration during development of our treatment plan include: Limited access to healthcare and Health Literacy    Procedures  Studies:   Labs:  Labs Reviewed   CBC W/ AUTO DIFFERENTIAL - Abnormal       Result Value    WBC 13.10 (*)     RBC 4.26      Hemoglobin 12.9      Hematocrit 36.6 (*)     MCV 86      MCH 30.3      MCHC 35.2      RDW 12.6      Platelets 274      MPV 10.3      Immature Granulocytes 0.5      Gran # (ANC) 11.3 (*)     Immature Grans (Abs) 0.06 (*)     Lymph # 1.4      Mono # 0.3      Eos # 0.0      Baso # 0.01      nRBC 0      Gran % 86.4 (*)     Lymph % 10.5 (*)     Mono % 2.4 (*)     Eosinophil % 0.1      Basophil % 0.1      Differential Method Automated     COMPREHENSIVE METABOLIC PANEL - Abnormal    Sodium 140      Potassium 3.5      Chloride 108      CO2 17 (*)     Glucose 108      BUN 19      Creatinine 0.7      Calcium 9.0      Total Protein 8.0      Albumin 4.2      Total Bilirubin 0.4      Alkaline Phosphatase 108      AST 32      ALT 44      eGFR >60      Anion Gap 15     URINALYSIS, REFLEX TO URINE CULTURE - Abnormal    Specimen UA Urine, Clean Catch      Color, UA Yellow      Appearance, UA Clear      pH, UA 7.0      Specific Gravity, UA 1.025      Protein, UA Trace (*)     Glucose, UA Negative      Ketones, UA Negative      Bilirubin (UA) Negative      Occult Blood UA Trace (*)     Nitrite, UA Negative      Urobilinogen, UA Negative      Leukocytes, UA Negative      Narrative:     Specimen Source->Urine   INFLUENZA A & B BY MOLECULAR    Influenza A, Molecular Negative      Influenza B, Molecular Negative      Flu A & B Source Nasal swab     LIPASE    Lipase 21     SARS-COV-2 RNA AMPLIFICATION, QUAL    SARS-CoV-2 RNA, Amplification, Qual Negative     POCT URINE PREGNANCY    POC Preg Test, Ur  Negative       Acceptable Yes         Imaging:  X-Rays:   Independently Interpreted Readings:   Abdomen:   Abdomen and Pelvis CT with Contrast - Dilated appendix with periappendiceal fat stranding. No free fluid or free air.         Imaging Results               CT Abdomen Pelvis With IV Contrast NO Oral Contrast (Final result)  Result time 25 15:07:46      Final result by Marcell Mckeon MD (25 15:07:46)                   Impression:      This report was flagged in Epic as abnormal.    1. Findings most consistent with acute appendicitis, no abscess or perforation.  2. Surgical changes of the anterior abdominal wall without focal organized collection.  3. Please see above for several additional findings.      Electronically signed by: Marcell Mckeon MD  Date:    2025  Time:    15:07               Narrative:    EXAMINATION:  CT ABDOMEN PELVIS WITH IV CONTRAST    CLINICAL HISTORY:  Abdominal pain, post-op;s/p ;    TECHNIQUE:  Low dose axial images, sagittal and coronal reformations were obtained from the lung bases to the pubic symphysis following the IV administration of 75 mL of Omnipaque 350 .  Oral contrast was not given.    COMPARISON:  None.    FINDINGS:  Images of the lower thorax are remarkable for bilateral dependent atelectasis.    The liver, spleen, pancreas, gallbladder and adrenal glands are grossly unremarkable.  The stomach is decompressed without wall thickening.  The portal vein, splenic vein, SMV, celiac axis and SMA all are patent.  No significant abdominal lymphadenopathy.    The kidneys enhance symmetrically without hydronephrosis or nephrolithiasis.  The bilateral ureters are unremarkable without calculi seen.  The urinary bladder is unremarkable.  The uterus and adnexa are unremarkable.  There is a small amount of fluid in the pelvis.    The large bowel is for the most part decompressed.  The appendix is distended with fluid noting wall thickening  and surrounding inflammation.  The appendix measures up to 1 cm.  No abscess or free air.  The small bowel is unremarkable.  There are a few scattered shotty periaortic, pericaval, and mesenteric lymph nodes.  No focal organized pelvic fluid collection.    No acute osseous abnormalities.  Surgical changes are noted of the anterior abdominal wall without focal organized fluid collection.                                         ED Course:     ED Course as of 02/03/25 1547   Sun Feb 02, 2025   1607 SARS-CoV-2 RNA, Amplification, Qual: Negative [NP]   1607 Influenza A, Molecular: Negative [NP]   1607 Influenza B, Molecular: Negative [NP]   1607 Sodium: 140 [NP]   1607 Potassium: 3.5 [NP]   1607 CO2(!): 17 [NP]   1607 Chloride: 108 [NP]   1607 Glucose: 108 [NP]   1607 BUN: 19 [NP]   1607 Creatinine: 0.7 [NP]   1607 Lipase: 21 [NP]   1607 WBC(!): 13.10 [NP]   1607 hCG Qualitative, Urine: Negative [NP]   1607 Leukocyte Esterase, UA: Negative [NP]   1607 NITRITE UA: Negative [NP]   1608 CT Abdomen Pelvis With IV Contrast NO Oral Contrast(!)  Acute appendicitis [NP]      ED Course User Index  [NP] Kyrie Sommers MD         Case discussed with Dr. Lr, gen surg, regarding CT findings and plan for admission.         Clinical Impression:   Final diagnoses:  [K35.80] Acute appendicitis (Primary)            No orders of the defined types were placed in this encounter.      DISCLAIMER: This note was prepared with [employer] voice recognition transcription software. Garbled syntax, mangled pronouns, and other bizarre constructions may be attributed to that software system.     Kyrie Sommers MD  02/03/25 1547

## 2025-02-02 NOTE — ED NOTES
"Pt came to ED with CC of stomach pain. Pt states that the stomach pain started around 0500 am. PT states that her stomach "just really really hurts and I can't move. It just hurts really bad". Pt states she has been vomiting non stop. Pt states she had a  3 weeks ago and also has a hx of gastritis.   "

## 2025-02-02 NOTE — ASSESSMENT & PLAN NOTE
24 yoF, 3 weeks postpartum, presenting with acute appendicitis. We discussed the indication for surgery, as well as the risks and benefits, and she would like to proceed.    - Admit to General Surgery, observation status  - Regular diet, NPO at midnight  - OR tomorrow for laparoscopic appendectomy  - Continue Zosyn  - IV/PO pain and nausea regimen  - mIVF  - Lovenox for DVT ppx

## 2025-02-02 NOTE — FIRST PROVIDER EVALUATION
Emergency Department TeleTriage Encounter Note      CHIEF COMPLAINT    Chief Complaint   Patient presents with    Abdominal Pain     Upper abd pain with n/v that began at 0500, pt has hx of gastritis. Denies fever, denies urinary complaints, last ate-last night        VITAL SIGNS   Initial Vitals [02/02/25 1224]   BP Pulse Resp Temp SpO2   125/68 72 18 97.6 °F (36.4 °C) 100 %      MAP       --            ALLERGIES    Review of patient's allergies indicates:  No Known Allergies    PROVIDER TRIAGE NOTE  Verbal consent for the teletriage evaluation was given by the patient at the start of the evaluation.  All efforts will be made to maintain patient's privacy during the evaluation.      This is a teletriage evaluation of a 24 y.o. female presenting to the ED with c/o abd pain with nausea and vomiting that started at 5 am.  Took prilosec PTA with no relief. Limited physical exam via telehealth: The patient is awake, alert, answering questions appropriately and is not in respiratory distress.  As the Teletriage provider, I performed an initial assessment and ordered appropriate labs and imaging studies, if any, to facilitate the patient's care once placed in the ED. Once a room is available, care and a full evaluation will be completed by an alternate ED provider.  Any additional orders and the final disposition will be determined by that provider.  All imaging and labs will not be followed-up by the Teletriage Team, including myself.         ORDERS  Labs Reviewed - No data to display    ED Orders (720h ago, onward)      Start Ordered     Status Ordering Provider    02/02/25 1229 02/02/25 1228  Vital signs  Every 2 hours         Ordered GASTON WARD    02/02/25 1229 02/02/25 1228  Diet NPO  Diet effective now         Ordered GASTON WARD    02/02/25 1229 02/02/25 1228  Insert peripheral IV  Once         Ordered GASTON WARD    02/02/25 1229 02/02/25 1228  CBC W/ AUTO DIFFERENTIAL  STAT         Ordered GASTON WARD     02/02/25 1229 02/02/25 1228  Comp. Metabolic Panel  STAT         Ordered GASTON WARD    02/02/25 1229 02/02/25 1228  Lipase  STAT         Ordered GASTON WARD    02/02/25 1229 02/02/25 1228  Urinalysis, Reflex to Urine Culture Urine, Clean Catch  STAT         Ordered GASTON WARD    02/02/25 1229 02/02/25 1228  POCT urine pregnancy  Once         Ordered GASTON WARD    02/02/25 1229 02/02/25 1228  POCT COVID-19 Rapid Screening  Once         Ordered GASTON WARD    02/02/25 1229 02/02/25 1228  POCT Influenza A/B Molecular  Once         Ordered GASTON WARD              Virtual Visit Note: The provider triage portion of this emergency department evaluation and documentation was performed via [employer], a HIPAA-compliant telemedicine application, in concert with a tele-presenter in the room. A face to face patient evaluation with one of my colleagues will occur once the patient is placed in an emergency department room.      DISCLAIMER: This note was prepared with [employer] voice recognition transcription software. Garbled syntax, mangled pronouns, and other bizarre constructions may be attributed to that software system.

## 2025-02-02 NOTE — H&P
[place] - Emergency Dept  General Surgery  History & Physical    Patient Name: Cindy Greenberg  MRN: [id number]  Admission Date: 2025  Attending Physician: Gumaro Stephens MD   Primary Care Provider: Sonay, Primary Doctor    Patient information was obtained from patient and ER records.     Subjective:     Chief Complaint:   Chief Complaint   Patient presents with    Abdominal Pain     Upper abd pain with n/v that began at 0500, pt has hx of gastritis. Denies fever, denies urinary complaints, last ate-last night         History of Present Illness: Cindy is a pleasant 24 yoF presenting with abdominal pain. She is 3 weeks s/p  of a healthy boy named Ricardo, whom she feeds with formula. She was doing well until early this morning when she began having periumbilical pain. The pain has since migrated to the hypogastric region. The pain has been associated with nausea and vomiting. No urinary symptoms. Workup in the ED demonstrated normal vitals, WBC of 13 with a left shift, and CT scan with a 1 cm appendix with periappendiceal fat stranding and wall thickening.    No other abdominal surgeries. No home medications. No allergies.     No current facility-administered medications on file prior to encounter.     Current Outpatient Medications on File Prior to Encounter   Medication Sig    sucralfate (CARAFATE) 100 mg/mL suspension Take 10 mLs (1 g total) by mouth 4 (four) times daily.    ondansetron (ZOFRAN) 4 MG tablet Take 1 tablet (4 mg total) by mouth every 6 (six) hours.       Review of patient's allergies indicates:  No Known Allergies    History reviewed. No pertinent past medical history.  History reviewed. No pertinent surgical history.  Family History    None       Tobacco Use    Smoking status: Never    Smokeless tobacco: Never   Substance and Sexual Activity    Alcohol use: Not on file    Drug use: Not on file    Sexual activity: Not on file     Review of Systems   Constitutional:   Negative for chills and fever.   Respiratory:  Negative for cough and shortness of breath.    Cardiovascular:  Negative for chest pain and palpitations.   Gastrointestinal:  Positive for abdominal pain, nausea and vomiting.   Genitourinary:  Negative for dysuria.   Neurological:  Negative for dizziness and light-headedness.     Objective:     Vital Signs (Most Recent):  Temp: 97.6 °F (36.4 °C) (02/02/25 1224)  Pulse: 78 (02/02/25 1632)  Resp: (!) 22 (02/02/25 1354)  BP: 100/64 (02/02/25 1632)  SpO2: 99 % (02/02/25 1632) Vital Signs (24h Range):  Temp:  [97.6 °F (36.4 °C)] 97.6 °F (36.4 °C)  Pulse:  [48-78] 78  Resp:  [18-22] 22  SpO2:  [98 %-100 %] 99 %  BP: (100-125)/(56-68) 100/64     Weight: 72.6 kg (160 lb)  Body mass index is 29.26 kg/m².     Physical Exam  Vitals reviewed.   Constitutional:       Appearance: Normal appearance.   Cardiovascular:      Rate and Rhythm: Normal rate.      Pulses: Normal pulses.   Pulmonary:      Effort: Pulmonary effort is normal.   Abdominal:      General: There is no distension.      Palpations: Abdomen is soft. There is no mass.      Tenderness: There is abdominal tenderness. There is no guarding.      Comments: Lower abdominal tenderness worse in the RLQ and suprapubic regions. Pfannenstiel incision healing well.    Skin:     General: Skin is warm and dry.   Neurological:      General: No focal deficit present.      Mental Status: She is alert and oriented to person, place, and time.            I have reviewed all pertinent lab results within the past 24 hours.  CBC:   Recent Labs   Lab 02/02/25  1243   WBC 13.10*   RBC 4.26   HGB 12.9   HCT 36.6*      MCV 86   MCH 30.3   MCHC 35.2     CMP:   Recent Labs   Lab 02/02/25  1243      CALCIUM 9.0   ALBUMIN 4.2   PROT 8.0      K 3.5   CO2 17*      BUN 19   CREATININE 0.7   ALKPHOS 108   ALT 44   AST 32   BILITOT 0.4       Significant Diagnostics:  I have reviewed all pertinent imaging results/findings within  the past 24 hours.    Assessment/Plan:     * Acute appendicitis  24 yoF, 3 weeks postpartum, presenting with acute appendicitis. We discussed the indication for surgery, as well as the risks and benefits, and she would like to proceed.    - Admit to General Surgery, observation status  - Regular diet, NPO at midnight  - OR tomorrow for laparoscopic appendectomy  - Continue Zosyn  - IV/PO pain and nausea regimen  - mIVF  - Lovenox for DVT ppx      VTE Risk Mitigation (From admission, onward)      None            Omar Lr MD  General Surgery  [place] - Emergency Dept

## 2025-02-03 ENCOUNTER — ANESTHESIA (OUTPATIENT)
Dept: SURGERY | Facility: HOSPITAL | Age: 25
End: 2025-02-03

## 2025-02-03 ENCOUNTER — ANESTHESIA EVENT (OUTPATIENT)
Dept: SURGERY | Facility: HOSPITAL | Age: 25
End: 2025-02-03

## 2025-02-03 VITALS
OXYGEN SATURATION: 97 % | WEIGHT: 160 LBS | RESPIRATION RATE: 17 BRPM | HEART RATE: 70 BPM | BODY MASS INDEX: 29.26 KG/M2 | TEMPERATURE: 98 F | DIASTOLIC BLOOD PRESSURE: 65 MMHG | SYSTOLIC BLOOD PRESSURE: 103 MMHG

## 2025-02-03 PROCEDURE — 36000708 HC OR TIME LEV III 1ST 15 MIN: Performed by: STUDENT IN AN ORGANIZED HEALTH CARE EDUCATION/TRAINING PROGRAM

## 2025-02-03 PROCEDURE — 96366 THER/PROPH/DIAG IV INF ADDON: CPT

## 2025-02-03 PROCEDURE — D9220A PRA ANESTHESIA: Mod: ,,, | Performed by: STUDENT IN AN ORGANIZED HEALTH CARE EDUCATION/TRAINING PROGRAM

## 2025-02-03 PROCEDURE — 44970 LAPAROSCOPY APPENDECTOMY: CPT | Mod: ,,, | Performed by: STUDENT IN AN ORGANIZED HEALTH CARE EDUCATION/TRAINING PROGRAM

## 2025-02-03 PROCEDURE — 71000016 HC POSTOP RECOV ADDL HR: Performed by: STUDENT IN AN ORGANIZED HEALTH CARE EDUCATION/TRAINING PROGRAM

## 2025-02-03 PROCEDURE — 96375 TX/PRO/DX INJ NEW DRUG ADDON: CPT

## 2025-02-03 PROCEDURE — 63600175 PHARM REV CODE 636 W HCPCS: Performed by: STUDENT IN AN ORGANIZED HEALTH CARE EDUCATION/TRAINING PROGRAM

## 2025-02-03 PROCEDURE — 88304 TISSUE EXAM BY PATHOLOGIST: CPT | Performed by: STUDENT IN AN ORGANIZED HEALTH CARE EDUCATION/TRAINING PROGRAM

## 2025-02-03 PROCEDURE — 63600175 PHARM REV CODE 636 W HCPCS

## 2025-02-03 PROCEDURE — 25000003 PHARM REV CODE 250: Performed by: STUDENT IN AN ORGANIZED HEALTH CARE EDUCATION/TRAINING PROGRAM

## 2025-02-03 PROCEDURE — 71000015 HC POSTOP RECOV 1ST HR: Performed by: STUDENT IN AN ORGANIZED HEALTH CARE EDUCATION/TRAINING PROGRAM

## 2025-02-03 PROCEDURE — 25000003 PHARM REV CODE 250

## 2025-02-03 PROCEDURE — 37000008 HC ANESTHESIA 1ST 15 MINUTES: Performed by: STUDENT IN AN ORGANIZED HEALTH CARE EDUCATION/TRAINING PROGRAM

## 2025-02-03 PROCEDURE — G0378 HOSPITAL OBSERVATION PER HR: HCPCS

## 2025-02-03 PROCEDURE — 88304 TISSUE EXAM BY PATHOLOGIST: CPT | Mod: 26,,, | Performed by: STUDENT IN AN ORGANIZED HEALTH CARE EDUCATION/TRAINING PROGRAM

## 2025-02-03 PROCEDURE — 37000009 HC ANESTHESIA EA ADD 15 MINS: Performed by: STUDENT IN AN ORGANIZED HEALTH CARE EDUCATION/TRAINING PROGRAM

## 2025-02-03 PROCEDURE — 27201423 OPTIME MED/SURG SUP & DEVICES STERILE SUPPLY: Performed by: STUDENT IN AN ORGANIZED HEALTH CARE EDUCATION/TRAINING PROGRAM

## 2025-02-03 PROCEDURE — 36000709 HC OR TIME LEV III EA ADD 15 MIN: Performed by: STUDENT IN AN ORGANIZED HEALTH CARE EDUCATION/TRAINING PROGRAM

## 2025-02-03 PROCEDURE — 71000033 HC RECOVERY, INTIAL HOUR: Performed by: STUDENT IN AN ORGANIZED HEALTH CARE EDUCATION/TRAINING PROGRAM

## 2025-02-03 PROCEDURE — 63600175 PHARM REV CODE 636 W HCPCS: Mod: TB,JZ | Performed by: STUDENT IN AN ORGANIZED HEALTH CARE EDUCATION/TRAINING PROGRAM

## 2025-02-03 PROCEDURE — 99222 1ST HOSP IP/OBS MODERATE 55: CPT | Mod: 57,,, | Performed by: STUDENT IN AN ORGANIZED HEALTH CARE EDUCATION/TRAINING PROGRAM

## 2025-02-03 PROCEDURE — 96376 TX/PRO/DX INJ SAME DRUG ADON: CPT

## 2025-02-03 PROCEDURE — 63600175 PHARM REV CODE 636 W HCPCS: Mod: TB,JZ

## 2025-02-03 RX ORDER — HYDROCODONE BITARTRATE AND ACETAMINOPHEN 5; 325 MG/1; MG/1
1 TABLET ORAL EVERY 6 HOURS PRN
Qty: 10 TABLET | Refills: 0 | Status: SHIPPED | OUTPATIENT
Start: 2025-02-03

## 2025-02-03 RX ORDER — HYDROMORPHONE HYDROCHLORIDE 2 MG/ML
INJECTION, SOLUTION INTRAMUSCULAR; INTRAVENOUS; SUBCUTANEOUS
Status: DISCONTINUED | OUTPATIENT
Start: 2025-02-03 | End: 2025-02-03

## 2025-02-03 RX ORDER — DEXAMETHASONE SODIUM PHOSPHATE 4 MG/ML
INJECTION, SOLUTION INTRA-ARTICULAR; INTRALESIONAL; INTRAMUSCULAR; INTRAVENOUS; SOFT TISSUE
Status: DISCONTINUED | OUTPATIENT
Start: 2025-02-03 | End: 2025-02-03

## 2025-02-03 RX ORDER — PROPOFOL 10 MG/ML
VIAL (ML) INTRAVENOUS
Status: DISCONTINUED | OUTPATIENT
Start: 2025-02-03 | End: 2025-02-03

## 2025-02-03 RX ORDER — SUCCINYLCHOLINE CHLORIDE 20 MG/ML
INJECTION INTRAMUSCULAR; INTRAVENOUS
Status: DISCONTINUED | OUTPATIENT
Start: 2025-02-03 | End: 2025-02-03

## 2025-02-03 RX ORDER — HYDROMORPHONE HYDROCHLORIDE 2 MG/ML
0.5 INJECTION, SOLUTION INTRAMUSCULAR; INTRAVENOUS; SUBCUTANEOUS EVERY 4 HOURS PRN
Status: DISCONTINUED | OUTPATIENT
Start: 2025-02-03 | End: 2025-02-03 | Stop reason: HOSPADM

## 2025-02-03 RX ORDER — MIDAZOLAM HYDROCHLORIDE 1 MG/ML
INJECTION INTRAMUSCULAR; INTRAVENOUS
Status: DISCONTINUED | OUTPATIENT
Start: 2025-02-03 | End: 2025-02-03

## 2025-02-03 RX ORDER — DEXMEDETOMIDINE HYDROCHLORIDE 100 UG/ML
INJECTION, SOLUTION INTRAVENOUS
Status: DISCONTINUED | OUTPATIENT
Start: 2025-02-03 | End: 2025-02-03

## 2025-02-03 RX ORDER — HYDROCODONE BITARTRATE AND ACETAMINOPHEN 5; 325 MG/1; MG/1
1 TABLET ORAL EVERY 4 HOURS PRN
Status: DISCONTINUED | OUTPATIENT
Start: 2025-02-03 | End: 2025-02-03 | Stop reason: HOSPADM

## 2025-02-03 RX ORDER — LIDOCAINE HYDROCHLORIDE 20 MG/ML
INJECTION, SOLUTION EPIDURAL; INFILTRATION; INTRACAUDAL; PERINEURAL
Status: DISCONTINUED | OUTPATIENT
Start: 2025-02-03 | End: 2025-02-03

## 2025-02-03 RX ORDER — HYDROMORPHONE HYDROCHLORIDE 2 MG/ML
0.5 INJECTION, SOLUTION INTRAMUSCULAR; INTRAVENOUS; SUBCUTANEOUS ONCE
Status: COMPLETED | OUTPATIENT
Start: 2025-02-03 | End: 2025-02-03

## 2025-02-03 RX ORDER — HYDROMORPHONE HYDROCHLORIDE 2 MG/ML
1 INJECTION, SOLUTION INTRAMUSCULAR; INTRAVENOUS; SUBCUTANEOUS EVERY 4 HOURS PRN
Status: DISCONTINUED | OUTPATIENT
Start: 2025-02-03 | End: 2025-02-03 | Stop reason: HOSPADM

## 2025-02-03 RX ORDER — PHENYLEPHRINE HCL IN 0.9% NACL 1 MG/10 ML
SYRINGE (ML) INTRAVENOUS
Status: DISCONTINUED | OUTPATIENT
Start: 2025-02-03 | End: 2025-02-03

## 2025-02-03 RX ORDER — ROCURONIUM BROMIDE 10 MG/ML
INJECTION, SOLUTION INTRAVENOUS
Status: DISCONTINUED | OUTPATIENT
Start: 2025-02-03 | End: 2025-02-03

## 2025-02-03 RX ORDER — BUPIVACAINE HYDROCHLORIDE 5 MG/ML
INJECTION, SOLUTION EPIDURAL; INTRACAUDAL
Status: DISCONTINUED | OUTPATIENT
Start: 2025-02-03 | End: 2025-02-03 | Stop reason: HOSPADM

## 2025-02-03 RX ORDER — FENTANYL CITRATE 50 UG/ML
INJECTION, SOLUTION INTRAMUSCULAR; INTRAVENOUS
Status: DISCONTINUED | OUTPATIENT
Start: 2025-02-03 | End: 2025-02-03

## 2025-02-03 RX ORDER — KETOROLAC TROMETHAMINE 30 MG/ML
15 INJECTION, SOLUTION INTRAMUSCULAR; INTRAVENOUS ONCE
Status: COMPLETED | OUTPATIENT
Start: 2025-02-03 | End: 2025-02-03

## 2025-02-03 RX ORDER — ONDANSETRON HYDROCHLORIDE 2 MG/ML
INJECTION, SOLUTION INTRAVENOUS
Status: DISCONTINUED | OUTPATIENT
Start: 2025-02-03 | End: 2025-02-03

## 2025-02-03 RX ADMIN — HYDROMORPHONE HYDROCHLORIDE 0.5 MG: 2 INJECTION INTRAMUSCULAR; INTRAVENOUS; SUBCUTANEOUS at 11:02

## 2025-02-03 RX ADMIN — HYDROMORPHONE HYDROCHLORIDE 0.5 MG: 1 INJECTION, SOLUTION INTRAMUSCULAR; INTRAVENOUS; SUBCUTANEOUS at 01:02

## 2025-02-03 RX ADMIN — MIDAZOLAM HYDROCHLORIDE 2 MG: 1 INJECTION, SOLUTION INTRAMUSCULAR; INTRAVENOUS at 10:02

## 2025-02-03 RX ADMIN — SUGAMMADEX 400 MG: 100 INJECTION, SOLUTION INTRAVENOUS at 11:02

## 2025-02-03 RX ADMIN — PROCHLORPERAZINE EDISYLATE 5 MG: 5 INJECTION INTRAMUSCULAR; INTRAVENOUS at 01:02

## 2025-02-03 RX ADMIN — DEXAMETHASONE SODIUM PHOSPHATE 8 MG: 4 INJECTION, SOLUTION INTRA-ARTICULAR; INTRALESIONAL; INTRAMUSCULAR; INTRAVENOUS; SOFT TISSUE at 10:02

## 2025-02-03 RX ADMIN — KETOROLAC TROMETHAMINE 15 MG: 30 INJECTION, SOLUTION INTRAMUSCULAR; INTRAVENOUS at 01:02

## 2025-02-03 RX ADMIN — ONDANSETRON 4 MG: 2 INJECTION, SOLUTION INTRAMUSCULAR; INTRAVENOUS at 11:02

## 2025-02-03 RX ADMIN — FENTANYL CITRATE 100 MCG: 50 INJECTION INTRAMUSCULAR; INTRAVENOUS at 10:02

## 2025-02-03 RX ADMIN — PIPERACILLIN SODIUM AND TAZOBACTAM SODIUM 4.5 G: 4; .5 INJECTION, POWDER, LYOPHILIZED, FOR SOLUTION INTRAVENOUS at 06:02

## 2025-02-03 RX ADMIN — SUCCINYLCHOLINE CHLORIDE 140 MG: 20 INJECTION, SOLUTION INTRAMUSCULAR; INTRAVENOUS at 10:02

## 2025-02-03 RX ADMIN — DEXMEDETOMIDINE 8 MCG: 200 INJECTION, SOLUTION INTRAVENOUS at 10:02

## 2025-02-03 RX ADMIN — HYDROMORPHONE HYDROCHLORIDE 0.5 MG: 2 INJECTION INTRAMUSCULAR; INTRAVENOUS; SUBCUTANEOUS at 02:02

## 2025-02-03 RX ADMIN — LIDOCAINE HYDROCHLORIDE 40 MG: 20 INJECTION, SOLUTION EPIDURAL; INFILTRATION; INTRACAUDAL; PERINEURAL at 10:02

## 2025-02-03 RX ADMIN — Medication 200 MCG: at 10:02

## 2025-02-03 RX ADMIN — HYDROMORPHONE HYDROCHLORIDE 0.4 MG: 2 INJECTION INTRAMUSCULAR; INTRAVENOUS; SUBCUTANEOUS at 11:02

## 2025-02-03 RX ADMIN — PROPOFOL 160 MG: 10 INJECTION, EMULSION INTRAVENOUS at 10:02

## 2025-02-03 RX ADMIN — SODIUM CHLORIDE: 0.9 INJECTION, SOLUTION INTRAVENOUS at 10:02

## 2025-02-03 RX ADMIN — HYDROCODONE BITARTRATE AND ACETAMINOPHEN 1 TABLET: 5; 325 TABLET ORAL at 11:02

## 2025-02-03 RX ADMIN — ACETAMINOPHEN 1000 MG: 500 TABLET ORAL at 06:02

## 2025-02-03 RX ADMIN — ONDANSETRON 8 MG: 8 TABLET, ORALLY DISINTEGRATING ORAL at 03:02

## 2025-02-03 RX ADMIN — ROCURONIUM BROMIDE 50 MG: 10 INJECTION, SOLUTION INTRAVENOUS at 10:02

## 2025-02-03 NOTE — ANESTHESIA PREPROCEDURE EVALUATION
[facility]  Anesthesia Pre-Operative Evaluation         Patient Name: Cindy Greenberg  YOB: 2000  MRN: [id number]    SUBJECTIVE:     Pre-operative evaluation for Procedure(s) (LRB):  APPENDECTOMY, LAPAROSCOPIC (N/A)     02/03/2025    Cindy Greenberg is a 24 y.o. female healthy who presented with acute appendicitis. Patient had C/S 3 weeks ago.     Patient now presents for the above procedure(s).    TTE:   No echocardiogram results found for the past 12 months     Patient Active Problem List   Diagnosis    Acute appendicitis       Review of patient's allergies indicates:  No Known Allergies    Current Inpatient Medications:   acetaminophen  1,000 mg Oral Q8H    enoxparin  40 mg Subcutaneous Q24H (prophylaxis, 1700)    piperacillin-tazobactam (Zosyn) IV (PEDS and ADULTS) (extended infusion is not appropriate)  4.5 g Intravenous Q8H       No current facility-administered medications on file prior to encounter.     Current Outpatient Medications on File Prior to Encounter   Medication Sig Dispense Refill    ibuprofen (ADVIL,MOTRIN) 600 MG tablet Take 600 mg by mouth every 6 (six) hours.      ondansetron (ZOFRAN) 4 MG tablet Take 1 tablet (4 mg total) by mouth every 6 (six) hours. (Patient not taking: Reported on 2/2/2025) 12 tablet 0    sucralfate (CARAFATE) 100 mg/mL suspension Take 10 mLs (1 g total) by mouth 4 (four) times daily. (Patient not taking: Reported on 2/2/2025) 414 mL 0       History reviewed. No pertinent surgical history.    OBJECTIVE:     Vital Signs Range (Last 24H):  Temp:  [36.4 °C (97.6 °F)-37.1 °C (98.8 °F)]   Pulse:  [48-82]   Resp:  [15-22]   BP: ()/(50-68)   SpO2:  [98 %-100 %]       Significant Labs:  Lab Results   Component Value Date    WBC 13.10 (H) 02/02/2025    HGB 12.9 02/02/2025    HCT 36.6 (L) 02/02/2025     02/02/2025    ALT 44 02/02/2025    AST 32 02/02/2025     02/02/2025    K 3.5 02/02/2025      02/02/2025    CREATININE 0.7 02/02/2025    BUN 19 02/02/2025    CO2 17 (L) 02/02/2025       Diagnostic Studies: No relevant studies.    EKG:   No results found for this or any previous visit.    ASSESSMENT/PLAN:       Pre-op Assessment    I have reviewed the Patient Summary Reports.     I have reviewed the Nursing Notes. I have reviewed the NPO Status.   I have reviewed the Medications.     Review of Systems  Anesthesia Hx:  No problems with previous Anesthesia               Denies Personal Hx of Anesthesia complications.                    Social:  Non-Smoker       Hematology/Oncology:       -- Denies Anemia:                  Denies Current/Recent Cancer                Cardiovascular:  Exercise tolerance: good    Denies Hypertension.   Denies MI.  Denies CAD.     Denies Dysrhythmias.    Denies CHF.    no hyperlipidemia   ECG has been reviewed.    Functional Capacity good / => 4 METS                         Pulmonary:    Denies COPD.  Denies Asthma.   Denies Shortness of breath.   Denies Sleep Apnea.                Renal/:   Denies Chronic Renal Disease.                Hepatic/GI:      Denies GERD.   Acute appendicitis             Musculoskeletal:  Musculoskeletal Normal                Neurological:  Denies TIA.  Denies CVA.    Denies Seizures.                                Endocrine:  Endocrine Normal                Physical Exam  General: Well nourished, Cooperative, Alert and Oriented    Airway:  Mallampati: III   Mouth Opening: Small, but > 3cm  TM Distance: Normal  Tongue: Normal  Neck ROM: Normal ROM    Dental:  Intact, Prominent Incisors        Anesthesia Plan  Type of Anesthesia, risks & benefits discussed:    Anesthesia Type: Gen ETT  Intra-op Monitoring Plan: Standard ASA Monitors  Post Op Pain Control Plan: multimodal analgesia and IV/PO Opioids PRN  Induction:  IV and rapid sequence  Airway Plan: Video, Post-Induction  Informed Consent: Informed consent signed with the Patient and all parties  understand the risks and agree with anesthesia plan.  All questions answered.   ASA Score: 3  Day of Surgery Review of History & Physical: H&P Update referred to the surgeon/provider.    Ready For Surgery From Anesthesia Perspective.     .

## 2025-02-03 NOTE — ED NOTES
Report to CATALINA Hurtado, questions asked and answered. Pt now in room 15. Care formally transition to receiving staff.

## 2025-02-03 NOTE — PROGRESS NOTES
[facility] - Emergency Dept  General Surgery  Progress Note    Subjective:     History of Present Illness:  Cindy is a pleasant 24 yoF presenting with abdominal pain. She is 3 weeks s/p  of a healthy boy named Ricardo, whom she feeds with formula. She was doing well until early this morning when she began having periumbilical pain. The pain has since migrated to the hypogastric region. The pain has been associated with nausea and vomiting. No urinary symptoms. Workup in the ED demonstrated normal vitals, WBC of 13 with a left shift, and CT scan with a 1 cm appendix with periappendiceal fat stranding and wall thickening.    No other abdominal surgeries. No home medications. No allergies.     Post-Op Info:  Procedure(s) (LRB):  APPENDECTOMY, LAPAROSCOPIC (N/A)         Interval History: NAEON. Pain reasonably well controlled with prns. No nausea. AVSS.    Medications:  Continuous Infusions:   lactated ringers   Intravenous Continuous 75 mL/hr at 25 0030 Restarted at 25 003     Scheduled Meds:   acetaminophen  1,000 mg Oral Q8H    enoxparin  40 mg Subcutaneous Q24H (prophylaxis, )    piperacillin-tazobactam (Zosyn) IV (PEDS and ADULTS) (extended infusion is not appropriate)  4.5 g Intravenous Q8H     PRN Meds:  Current Facility-Administered Medications:     HYDROmorphone, 0.5 mg, Intravenous, Q4H PRN    HYDROmorphone, 1 mg, Intravenous, Q4H PRN    ibuprofen, 600 mg, Oral, Q6H PRN    LIDOcaine (PF) 10 mg/ml (1%), 1 mL, Intradermal, Once PRN    melatonin, 6 mg, Oral, Nightly PRN    ondansetron, 8 mg, Oral, Q8H PRN    prochlorperazine, 5 mg, Intravenous, Q6H PRN    sodium chloride 0.9%, 10 mL, Intravenous, PRN     Review of patient's allergies indicates:  No Known Allergies  Objective:     Vital Signs (Most Recent):  Temp: 98.8 °F (37.1 °C) (25 0159)  Pulse: 82 (25 2202)  Resp: 15 (25 0145)  BP: (!) 110/56 (25 0148)  SpO2: 99 % (25 2202) Vital Signs (24h Range):  Temp:   [97.6 °F (36.4 °C)-98.8 °F (37.1 °C)] 98.8 °F (37.1 °C)  Pulse:  [48-82] 82  Resp:  [15-22] 15  SpO2:  [98 %-100 %] 99 %  BP: (100-125)/(56-68) 110/56     Weight: 72.6 kg (160 lb)  Body mass index is 29.26 kg/m².    Intake/Output - Last 3 Shifts         02/01 0700  02/02 0659 02/02 0700  02/03 0659    IV Piggyback  101.8    Total Intake(mL/kg)  101.8 (1.4)    Net  +101.8                   Physical Exam  Vitals reviewed.   Constitutional:       Appearance: Normal appearance.   Cardiovascular:      Rate and Rhythm: Normal rate.      Pulses: Normal pulses.   Pulmonary:      Effort: Pulmonary effort is normal.   Abdominal:      General: There is no distension.      Palpations: Abdomen is soft. There is no mass.      Tenderness: There is abdominal tenderness. There is no guarding.   Skin:     General: Skin is warm and dry.   Neurological:      General: No focal deficit present.      Mental Status: She is alert and oriented to person, place, and time.          Significant Labs:  I have reviewed all pertinent lab results within the past 24 hours.    Significant Diagnostics:  I have reviewed all pertinent imaging results/findings within the past 24 hours.  Assessment/Plan:     * Acute appendicitis  24 yoF, 3 weeks postpartum, presenting with acute appendicitis. We discussed the indication for surgery, as well as the risks and benefits, and she would like to proceed.    - OR this morning for laparoscopic appendectomy  - NPO  - Continue Zosyn  - IV/PO pain and nausea regimen  - mIVF  - Lovenox for DVT ppx        Omar Lr MD  General Surgery  [facility] - Emergency Dept

## 2025-02-03 NOTE — TRANSFER OF CARE
Anesthesia Transfer of Care Note    Patient: Cindy Greenberg    Procedure(s) Performed: Procedure(s) (LRB):  APPENDECTOMY, LAPAROSCOPIC (N/A)    Patient location: PACU    Anesthesia Type: general    Transport from OR: Transported from OR on 6-10 L/min O2 by face mask with adequate spontaneous ventilation    Post pain: adequate analgesia    Post assessment: no apparent anesthetic complications    Post vital signs: stable    Level of consciousness: awake    Nausea/Vomiting: no nausea/vomiting    Complications: none    Transfer of care protocol was followed      Last vitals: Visit Vitals  BP (!) 91/50   Pulse 61   Temp 37.1 °C (98.8 °F) (Oral)   Resp 15   Wt 72.6 kg (160 lb)   SpO2 98%   Breastfeeding No   BMI 29.26 kg/m²

## 2025-02-03 NOTE — ANESTHESIA PROCEDURE NOTES
Intubation    Date/Time: 2/3/2025 10:19 AM    Performed by: Dylan Farrell DO  Authorized by: Sergei Rangel DO    Intubation:     Induction:  Rapid sequence induction    Intubated:  Postinduction    Mask Ventilation:  Not attempted    Attempts:  1    Attempted By:  Resident anesthesiologist    Method of Intubation:  Video laryngoscopy    Blade:  [place] 3    Laryngeal View Grade: Grade I - full view of cords      Difficult Airway Encountered?: No      Complications:  None    Airway Device:  Oral endotracheal tube    Airway Device Size:  7.0    Style/Cuff Inflation:  Cuffed (inflated to minimal occlusive pressure)    Tube secured:  23    Secured at:  The lips    Placement Verified By:  Capnometry    Complicating Factors:  Small mouth and short neck    Findings Post-Intubation:  BS equal bilateral and atraumatic/condition of teeth unchanged

## 2025-02-03 NOTE — ASSESSMENT & PLAN NOTE
24 yoF, 3 weeks postpartum, presenting with acute appendicitis. We discussed the indication for surgery, as well as the risks and benefits, and she would like to proceed.    - OR this morning for laparoscopic appendectomy  - NPO  - Continue Zosyn  - IV/PO pain and nausea regimen  - mIVF  - Lovenox for DVT ppx

## 2025-02-03 NOTE — ED NOTES
Report received from CATALINA Canales  assumed care. Pt connected to bs monitor, VSS. Pt c/o pain 6/10 Dilaudid given per MD order. Standard safety measures in place will continue to monitor/

## 2025-02-03 NOTE — ED NOTES
Pt resting with eyes closed. VSS. No apparent distress noted. Standard safety measures in place will continue to monitor.

## 2025-02-03 NOTE — ED NOTES
Report received per CATALINA Hurtado. Pt resting quietly in bed. AAOx4. Resp even and unlabored. Denies any complaints at this time. VSS and pt in no visible distress.

## 2025-02-03 NOTE — PHARMACY MED REC
"    [facility]           Pharmacy  Admission Medication History     The home medication history was taken by Pamela Phillips.      Medication history obtained from Medications listed below were obtained from: Patient/family.    Based on information gathered for medication list, you may go to "Admission" then "Reconcile Home Medications" tabs to review and/or act upon those items.     The home medication list has been updated by the Pharmacy department.   Please read ALL comments highlighted in yellow.   Please address this information as you see fit.    Feel free to contact us if you have any questions or require assistance.        No current facility-administered medications on file prior to encounter.     Current Outpatient Medications on File Prior to Encounter   Medication Sig Dispense Refill    ibuprofen (ADVIL,MOTRIN) 600 MG tablet Take 600 mg by mouth every 6 (six) hours.      ondansetron (ZOFRAN) 4 MG tablet Take 1 tablet (4 mg total) by mouth every 6 (six) hours. (Patient not taking: Reported on 2/2/2025) 12 tablet 0    sucralfate (CARAFATE) 100 mg/mL suspension Take 10 mLs (1 g total) by mouth 4 (four) times daily. (Patient not taking: Reported on 2/2/2025) 414 mL 0       Please address this information as you see fit.  Feel free to contact us if you have any questions or require assistance.    Pamela Phillips  [phone]              .          "

## 2025-02-03 NOTE — OP NOTE
Date of Procedure:  02/03/2025    Procedure: Procedure(s) (LRB):  APPENDECTOMY-LAPAROSCOPIC (N/A)    Surgeon(s) and Role:  * Gumaro Stephens MD - Primary    Assisting Surgeon: Rancho Lr PGY4    Pre-Operative Diagnosis: Acute appendicitis without mention of peritonitis    Post-Operative Diagnosis: Acute appendicitis without mention of peritonitis    Anesthesia: General    Technical Procedures Used: lap    INDICATIONS: The patient is a 24 y.o. female  who presents to the emergency room with clinical and radiologic evidence of acute appendicitis. The patient was counseled on his options for treatment and desired to proceed with surgical intervention. The risks of the procedure were described to the patient including bleeding, infection, pain, scarring, wound complications, injury to local structures, and potential need for further surgery. The patient demonstrated understanding of these risks and a consent   form was obtained.    PROCEDURE: The patient was identified in the Preoperative Unit and taken back   to the Operating Room and laid supine on the operating room table. IV   antibiotics were administered prior to the induction of general anesthesia.   General anesthesia was induced without complication. The patient was then   prepped and draped in standard sterile fashion manner. A timeout procedure was   performed in accordance of hospital protocol.     A 5 mm incision was made in the   supraumbilical location using a #15 blade scalpel. The 5mm optical trocar was then inserted under direct vision. CO2 insufflation was initiated. A camera was inserted and the abdomen  was inspected. There was evidence of acute inflammation in the right lower quadrant.   There did not appear to be any other abnormalities within the abdomen.     Additional 12 mm cannula then placed in the left lower quadrant of the abdomen and a 5mm cannula was placed in the suprapubic region under direct vision.The patient was placed in  Trendelenburg and left lateral decubitus position. The small intestines were retracted in the cephalad and left lateral direction away from the pelvis and right lower quadrant. The patient was found to have an enlarged and inflamed appendix. There was no evidence of perforation.  A blue load endo-CARMENCITA was fired across the appendiceal base. A ligasure was then used to fire across the mesoappendix. There was no evidence of bleeding, leakage, or complication after division of the appendix. Irrigation was also performed and this fluid was evacuated from the abdomen. The appendix was placed into an endocatch bag and removed through the left low quadrant port site.   The LLQ port site was closed using vicryl sutures in a figure eight fashion at the level of the fascia. The trocar site skin wounds were closed using monocryl.    Instrument, sponge, and needle counts were correct at the conclusion of the case.       Complications: None; patient tolerated the procedure well.    Estimated Blood Loss (EBL): 10mL    Drains: none    Implants: none    Specimens: appendix    Condition: stable    Disposition: PACU - hemodynamically stable.    Attestation: I was present and scrubbed for the entire procedure.

## 2025-02-03 NOTE — ED NOTES
Report given to Lola in sx. Pt remains NPO. Denies any complaints at this time. VSS and pt in no acute distress. Will continue to monitor.

## 2025-02-03 NOTE — SUBJECTIVE & OBJECTIVE
Interval History: NAEON. Pain reasonably well controlled with prns. No nausea. AVSS.    Medications:  Continuous Infusions:   lactated ringers   Intravenous Continuous 75 mL/hr at 02/03/25 0030 Restarted at 02/03/25 0030     Scheduled Meds:   acetaminophen  1,000 mg Oral Q8H    enoxparin  40 mg Subcutaneous Q24H (prophylaxis, 1700)    piperacillin-tazobactam (Zosyn) IV (PEDS and ADULTS) (extended infusion is not appropriate)  4.5 g Intravenous Q8H     PRN Meds:  Current Facility-Administered Medications:     HYDROmorphone, 0.5 mg, Intravenous, Q4H PRN    HYDROmorphone, 1 mg, Intravenous, Q4H PRN    ibuprofen, 600 mg, Oral, Q6H PRN    LIDOcaine (PF) 10 mg/ml (1%), 1 mL, Intradermal, Once PRN    melatonin, 6 mg, Oral, Nightly PRN    ondansetron, 8 mg, Oral, Q8H PRN    prochlorperazine, 5 mg, Intravenous, Q6H PRN    sodium chloride 0.9%, 10 mL, Intravenous, PRN     Review of patient's allergies indicates:  No Known Allergies  Objective:     Vital Signs (Most Recent):  Temp: 98.8 °F (37.1 °C) (02/03/25 0159)  Pulse: 82 (02/02/25 2202)  Resp: 15 (02/03/25 0145)  BP: (!) 110/56 (02/03/25 0148)  SpO2: 99 % (02/02/25 2202) Vital Signs (24h Range):  Temp:  [97.6 °F (36.4 °C)-98.8 °F (37.1 °C)] 98.8 °F (37.1 °C)  Pulse:  [48-82] 82  Resp:  [15-22] 15  SpO2:  [98 %-100 %] 99 %  BP: (100-125)/(56-68) 110/56     Weight: 72.6 kg (160 lb)  Body mass index is 29.26 kg/m².    Intake/Output - Last 3 Shifts         02/01 0700  02/02 0659 02/02 0700 02/03 0659    IV Piggyback  101.8    Total Intake(mL/kg)  101.8 (1.4)    Net  +101.8                   Physical Exam  Vitals reviewed.   Constitutional:       Appearance: Normal appearance.   Cardiovascular:      Rate and Rhythm: Normal rate.      Pulses: Normal pulses.   Pulmonary:      Effort: Pulmonary effort is normal.   Abdominal:      General: There is no distension.      Palpations: Abdomen is soft. There is no mass.      Tenderness: There is abdominal tenderness. There is no  guarding.   Skin:     General: Skin is warm and dry.   Neurological:      General: No focal deficit present.      Mental Status: She is alert and oriented to person, place, and time.          Significant Labs:  I have reviewed all pertinent lab results within the past 24 hours.    Significant Diagnostics:  I have reviewed all pertinent imaging results/findings within the past 24 hours.

## 2025-02-06 LAB
FINAL PATHOLOGIC DIAGNOSIS: NORMAL
GROSS: NORMAL
Lab: NORMAL
MICROSCOPIC EXAM: NORMAL

## 2025-02-18 ENCOUNTER — OFFICE VISIT (OUTPATIENT)
Dept: SURGERY | Facility: CLINIC | Age: 25
End: 2025-02-18

## 2025-02-18 VITALS
BODY MASS INDEX: 30.12 KG/M2 | HEIGHT: 62 IN | WEIGHT: 163.69 LBS | HEART RATE: 65 BPM | DIASTOLIC BLOOD PRESSURE: 69 MMHG | TEMPERATURE: 98 F | SYSTOLIC BLOOD PRESSURE: 100 MMHG

## 2025-02-18 DIAGNOSIS — Z90.49 S/P APPENDECTOMY: Primary | ICD-10-CM

## 2025-02-18 PROCEDURE — 99213 OFFICE O/P EST LOW 20 MIN: CPT | Mod: PBBFAC,PN | Performed by: STUDENT IN AN ORGANIZED HEALTH CARE EDUCATION/TRAINING PROGRAM

## 2025-02-18 PROCEDURE — 99999 PR PBB SHADOW E&M-EST. PATIENT-LVL III: CPT | Mod: PBBFAC,,, | Performed by: STUDENT IN AN ORGANIZED HEALTH CARE EDUCATION/TRAINING PROGRAM

## 2025-02-18 PROCEDURE — 99024 POSTOP FOLLOW-UP VISIT: CPT | Mod: ,,, | Performed by: STUDENT IN AN ORGANIZED HEALTH CARE EDUCATION/TRAINING PROGRAM

## (undated) DEVICE — SUT MCRYL PLUS 4-0 PS2 27IN

## (undated) DEVICE — CART STAPLE FLEX ETX 3.5MM BLU

## (undated) DEVICE — NDL HYPO REG 25G X 1 1/2

## (undated) DEVICE — SUT VICRYL+ 27 UR-6 VIOL

## (undated) DEVICE — BAG TISS RETRV MONARCH 10MM

## (undated) DEVICE — TROCAR ENDOPATH XCEL 5X75MM

## (undated) DEVICE — GLOVE SURGICAL LATEX SZ 7

## (undated) DEVICE — Device

## (undated) DEVICE — SYS CLSR WND ENDOSCP XL

## (undated) DEVICE — TROCAR ENDOPATH XCEL 5MM 7.5CM

## (undated) DEVICE — MANIFOLD 4 PORT

## (undated) DEVICE — TROCAR ENDOPATH XCEL 12X100MM

## (undated) DEVICE — STAPLER INT LINEAR ARTC 3.5-45

## (undated) DEVICE — ELECTRODE REM PLYHSV RETURN 9

## (undated) DEVICE — SOL IRR 0.9% NACL 500ML PB

## (undated) DEVICE — IRRIGATOR ENDOSCOPY DISP.

## (undated) DEVICE — SEALER LIGASURE LAP 37CM 5MM

## (undated) DEVICE — SODIUM CHLORIDE 0.9% 1000ML

## (undated) DEVICE — ADHESIVE DERMABOND ADVANCED

## (undated) DEVICE — APPLIER CLIP ENDO MED/LG 10MM